# Patient Record
Sex: FEMALE | Race: BLACK OR AFRICAN AMERICAN | Employment: FULL TIME | URBAN - METROPOLITAN AREA
[De-identification: names, ages, dates, MRNs, and addresses within clinical notes are randomized per-mention and may not be internally consistent; named-entity substitution may affect disease eponyms.]

---

## 2017-02-07 ENCOUNTER — HOSPITAL ENCOUNTER (OUTPATIENT)
Age: 32
Discharge: HOME | End: 2017-02-07
Attending: OBSTETRICS & GYNECOLOGY | Admitting: OBSTETRICS & GYNECOLOGY
Payer: MEDICAID

## 2017-02-07 VITALS — TEMPERATURE: 98.4 F | RESPIRATION RATE: 18 BRPM | BODY MASS INDEX: 29.16 KG/M2 | HEIGHT: 65 IN | WEIGHT: 175 LBS

## 2017-02-07 LAB
APPEARANCE UR: CLEAR
BILIRUB UR QL: NEGATIVE
COLOR UR: YELLOW
GLUCOSE UR STRIP.AUTO-MCNC: NEGATIVE MG/DL
HGB UR QL STRIP: NEGATIVE
KETONES UR QL STRIP.AUTO: NEGATIVE MG/DL
LEUKOCYTE ESTERASE UR QL STRIP.AUTO: NEGATIVE
NITRITE UR QL STRIP.AUTO: NEGATIVE
PH UR STRIP: 8.5 [PH] (ref 5–8)
PROT UR STRIP-MCNC: NEGATIVE MG/DL
SP GR UR REFRACTOMETRY: 1.01 (ref 1–1.03)
UROBILINOGEN UR QL STRIP.AUTO: 1 EU/DL (ref 0.2–1)

## 2017-02-07 PROCEDURE — 81003 URINALYSIS AUTO W/O SCOPE: CPT | Performed by: OBSTETRICS & GYNECOLOGY

## 2017-02-07 PROCEDURE — 59025 FETAL NON-STRESS TEST: CPT

## 2017-02-07 PROCEDURE — 99283 EMERGENCY DEPT VISIT LOW MDM: CPT

## 2017-02-07 NOTE — PROGRESS NOTES
1150; pt to triage with c,/o left sided pain radiating into groin, that has been occurring since yesterday- on and off; ps=7,; denies need for analgesia; denies vag bleeding/loss; membranes intact ; feels fetal movement; states has [place] prenatal care out of state and that pregnancy healthy and uneventful; form signed  And faxed for request for medical info; efm on for assessment  1330; pt states pain is occas now- feels better as she is lying down  [phone]; pt OB office in [place] called several times with affirmative that they will send pts prenatal info; lab called for urinalysis results  5205; Dr Abhinav Rojas called with report- VE to be done; pt to make appointment to see Dr Abhinav Rojas today if possible, or tomorrow  [phone]; discharge instructions reviewed with pt; urinalysis results and prenatal records still not here/available- will fax to Dr Keiko Juárez when receive them; pt left with  ambulatory for home

## 2017-02-07 NOTE — DISCHARGE INSTRUCTIONS
[place] Contractions: Care Instructions  Your Care Instructions  [place] contractions prepare your uterus for labor. Think of them as a \"warm-up\" exercise that your body does. You may begin to feel them between the 28th and 30th weeks of your pregnancy. But they start as early as the 20th week. [place] contractions usually occur more often during the ninth month. They may go away when you are active and return when you rest. These contractions are like mild contractions of true labor, but they occur less often. (You feel fewer than 8 in an hour.) They don't cause your cervix to open. It may be hard for you to tell the difference between [place] contractions and true labor, especially in your first pregnancy. Follow-up care is a key part of your treatment and safety. Be sure to make and go to all appointments, and call your doctor if you are having problems. It's also a good idea to know your test results and keep a list of the medicines you take. How can you care for yourself at home? · Try a warm bath to help relieve muscle tension and reduce pain. · Change positions every 30 minutes. Take breaks if you must sit for a long time. Get up and walk around. · Drink plenty of water, enough so that your urine is light yellow or clear like water. · Taking short walks may help you feel better. Your doctor needs to check any contractions that are getting stronger or closer together. Where can you learn more? Go to http://hannah-madhavi.info/. Enter 938 675 178 in the search box to learn more about \"[place] Fraire Contractions: Care Instructions. \"  Current as of: May 30, 2016  Content Version: 11.1  © 5497-4064 [email or web address]. Care instructions adapted under license by [employer] (which disclaims liability or warranty for this information).  If you have questions about a medical condition or this instruction, always ask your healthcare professional. [employer], [facility] disclaims any warranty or liability for your use of this information. Week 39 of Your Pregnancy: Care Instructions  Your Care Instructions    During these final weeks, you may feel anxious to see your new baby. [place] babies often look different from what you see in pictures or movies. Right after birth, their heads may have a strange shape. Their eyes may be puffy. And their genitals may be swollen. They may also have very dry skin, or red marks on the eyelids, nose, or neck. Still, most parents think their babies are beautiful. Follow-up care is a key part of your treatment and safety. Be sure to make and go to all appointments, and call your doctor if you are having problems. It's also a good idea to know your test results and keep a list of the medicines you take. How can you care for yourself at home? Prepare to breastfeed  · If you are breastfeeding, continue to eat healthy foods. · Avoid alcohol, cigarettes, and drugs. This includes prescription and over-the-counter medicines. · You can help prevent sore nipples if you feed your baby in the correct position. Nurses will help you learn to do this. · Your  will need to be fed about every 1½ to 3 hours. Choose the right birth control after your baby is born  · Women who are breastfeeding can still get pregnant. Use birth control if you don't want to get pregnant. · Intrauterine devices (IUDs) work for women who want to wait at least 2 years before getting pregnant again. They are safe to use while you are breastfeeding. · Depo-Provera can be used while you are breastfeeding. It is a shot you get every 3 months. · Birth control pills work well. But you need a different kind of pill while you are breastfeeding. And when you start taking these pills, you need to make sure to use another type of birth control until you start your second pack.   · Diaphragms, cervical caps, tubal implants, and condoms with spermicide work less well after birth. If you have a diaphragm or cervical cap, you will need to have it refitted. · Tubal ligation (tying your tubes) and vasectomy are both permanent. These are good options if you are sure you are done having children. Where can you learn more? Go to http://hannah-madhavi.info/. Enter Z514 in the search box to learn more about \"Week 39 of Your Pregnancy: Care Instructions. \"  Current as of: May 30, 2016  Content Version: 11.1  © 0038-6484 [employer]. Care instructions adapted under license by [employer] (which disclaims liability or warranty for this information). If you have questions about a medical condition or this instruction, always ask your healthcare professional. [address] any warranty or liability for your use of this information.

## 2017-02-07 NOTE — IP AVS SNAPSHOT
[address] [address] 
[phone] Patient: Fredi Pryor MRN: [id number] ACS:0/11/4744 You are allergic to the following No active allergies Recent Documentation Height Weight BMI OB Status Smoking Status 1.651 m 79.4 kg 29.12 kg/m2 Pregnant Never Smoker Emergency Contacts Name Discharge Info Relation Home Work Mobile Deepti Reed DISCHARGE CAREGIVER [3] Boyfriend [17] [phone] About your hospitalization You were admitted on:  February 7, 2017 You last received care in the:  [facility] L&D TRIAGE You were discharged on:  February 7, 2017 Unit phone number:  [phone] Why you were hospitalized Your primary diagnosis was:  Not on File Providers Seen During Your Hospitalizations Provider Role Specialty Primary office phone Lisa Castañeda MD Attending Provider Obstetrics & Gynecology [phone] Your Primary Care Physician (PCP) Primary Care Physician Office Phone Office Fax NONE ** None ** ** None ** Follow-up Information Follow up With Details Comments Contact Info None   None ([phone]) Patient stated that they have no PCP Lisa Castañeda MD Today please make appointment to see Dr Parker Cummings today or tommorrow [address] [address] 
[phone] Current Discharge Medication List  
  
ASK your doctor about these medications Dose & Instructions Dispensing Information Comments Morning Noon Evening Bedtime Iron 160 mg (50 mg iron) Tber tablet Generic drug:  ferrous sulfate ER Your next dose is: Today, Tomorrow Other:  _________ Dose:  1 Tab Take 1 Tab by mouth daily. Refills:  0 PRENATAL DHA+COMPLETE PRENATAL -300 mg-mcg-mg Cmpk Generic drug:  PNV no.24-iron-folic acid-dha Your next dose is: Today, Tomorrow Other:  _________ Take  by mouth. Refills:  0 Discharge Instructions [facility] Contractions: Care Instructions Your Care Instructions [place] contractions prepare your uterus for labor. Think of them as a \"warm-up\" exercise that your body does. You may begin to feel them between the 28th and 30th weeks of your pregnancy. But they start as early as the 20th week. [place] contractions usually occur more often during the ninth month. They may go away when you are active and return when you rest. These contractions are like mild contractions of true labor, but they occur less often. (You feel fewer than 8 in an hour.) They don't cause your cervix to open. It may be hard for you to tell the difference between [facility] contractions and true labor, especially in your first pregnancy. Follow-up care is a key part of your treatment and safety. Be sure to make and go to all appointments, and call your doctor if you are having problems. It's also a good idea to know your test results and keep a list of the medicines you take. How can you care for yourself at home? · Try a warm bath to help relieve muscle tension and reduce pain. · Change positions every 30 minutes. Take breaks if you must sit for a long time. Get up and walk around. · Drink plenty of water, enough so that your urine is light yellow or clear like water. · Taking short walks may help you feel better. Your doctor needs to check any contractions that are getting stronger or closer together. Where can you learn more? Go to http://hannah-madhavi.info/. Enter 945 [id number] 229 in the search box to learn more about \"[place] Fraire Contractions: Care Instructions. \" Current as of: May 30, 2016 Content Version: 11.1 © 7616-9461 [employer].  Care instructions adapted under license by [employer] (which disclaims liability or warranty for this information). If you have questions about a medical condition or this instruction, always ask your healthcare professional. [place] [address] any warranty or liability for your use of this information. Week 39 of Your Pregnancy: Care Instructions Your Care Instructions During these final weeks, you may feel anxious to see your new baby. [place] babies often look different from what you see in pictures or movies. Right after birth, their heads may have a strange shape. Their eyes may be puffy. And their genitals may be swollen. They may also have very dry skin, or red marks on the eyelids, nose, or neck. Still, most parents think their babies are beautiful. Follow-up care is a key part of your treatment and safety. Be sure to make and go to all appointments, and call your doctor if you are having problems. It's also a good idea to know your test results and keep a list of the medicines you take. How can you care for yourself at home? Prepare to breastfeed · If you are breastfeeding, continue to eat healthy foods. · Avoid alcohol, cigarettes, and drugs. This includes prescription and over-the-counter medicines. · You can help prevent sore nipples if you feed your baby in the correct position. Nurses will help you learn to do this. · Your  will need to be fed about every 1½ to 3 hours. Choose the right birth control after your baby is born · Women who are breastfeeding can still get pregnant. Use birth control if you don't want to get pregnant. · Intrauterine devices (IUDs) work for women who want to wait at least 2 years before getting pregnant again. They are safe to use while you are breastfeeding. · Depo-Provera can be used while you are breastfeeding. It is a shot you get every 3 months. · Birth control pills work well. But you need a different kind of pill while you are breastfeeding.  And when you start taking these pills, you need to make sure to use another type of birth control until you start your second pack. · Diaphragms, cervical caps, tubal implants, and condoms with spermicide work less well after birth. If you have a diaphragm or cervical cap, you will need to have it refitted. · Tubal ligation (tying your tubes) and vasectomy are both permanent. These are good options if you are sure you are done having children. Where can you learn more? Go to http://hannah-madhavi.info/. Enter T779 in the search box to learn more about \"Week 39 of Your Pregnancy: Care Instructions. \" Current as of: May 30, 2016 Content Version: 11.1 © 2273-9277 [email or web address]. Care instructions adapted under license by [employer] (which disclaims liability or warranty for this information). If you have questions about a medical condition or this instruction, always ask your healthcare professional. [address] any warranty or liability for your use of this information. Discharge Orders None Introducing [facility]! [place] introduces [employer] patient portal. Now you can access parts of your medical record, email your doctor's office, and request medication refills online. 1. In your internet browser, go to [email or web address] 2. Click on the First Time User? Click Here link in the Sign In box. You will see the New Member Sign Up page. 3. Enter your [employer] Access Code exactly as it appears below. You will not need to use this code after youve completed the sign-up process. If you do not sign up before the expiration date, you must request a new code. · [employer] Access Code: [email or web address] Expires: 5/8/2017 11:35 AM 
 
4. Enter the last four digits of your Social Security Number (xxxx) and Date of Birth (mm/dd/yyyy) as indicated and click Submit. You will be taken to the next sign-up page. 5. Create a [employer] ID. This will be your [employer] login ID and cannot be changed, so think of one that is secure and easy to remember. 6. Create a [employer] password. You can change your password at any time. 7. Enter your Password Reset Question and Answer. This can be used at a later time if you forget your password. 8. Enter your e-mail address. You will receive e-mail notification when new information is available in [address] 9. Click Sign Up. You can now view and download portions of your medical record. 10. Click the Download Summary menu link to download a portable copy of your medical information. If you have questions, please visit the Frequently Asked Questions section of the [employer] website. Remember, [employer] is NOT to be used for urgent needs. For medical emergencies, dial 911. Now available from your iPhone and Android! General Information Please provide this summary of care documentation to your next provider. Patient Signature:  ____________________________________________________________ Date:  ____________________________________________________________  
  
Liudmila Guzman Provider Signature:  ____________________________________________________________ Date:  ____________________________________________________________

## 2017-02-13 ENCOUNTER — ANESTHESIA (OUTPATIENT)
Dept: LABOR AND DELIVERY | Age: 32
End: 2017-02-13
Payer: MEDICAID

## 2017-02-13 ENCOUNTER — HOSPITAL ENCOUNTER (INPATIENT)
Age: 32
LOS: 2 days | Discharge: HOME | End: 2017-02-15
Attending: OBSTETRICS & GYNECOLOGY | Admitting: OBSTETRICS & GYNECOLOGY
Payer: MEDICAID

## 2017-02-13 ENCOUNTER — ANESTHESIA EVENT (OUTPATIENT)
Dept: LABOR AND DELIVERY | Age: 32
End: 2017-02-13
Payer: MEDICAID

## 2017-02-13 PROBLEM — Z33.1 IUP (INTRAUTERINE PREGNANCY), INCIDENTAL: Status: ACTIVE | Noted: 2017-02-13

## 2017-02-13 LAB
ABO + RH BLD: NORMAL
BASOPHILS # BLD AUTO: 0 K/UL (ref 0–0.06)
BASOPHILS # BLD AUTO: 0 K/UL (ref 0–0.1)
BASOPHILS # BLD: 0 % (ref 0–2)
BASOPHILS # BLD: 0 % (ref 0–3)
BLOOD GROUP ANTIBODIES SERPL: NORMAL
DIFFERENTIAL METHOD BLD: ABNORMAL
DIFFERENTIAL METHOD BLD: ABNORMAL
EOSINOPHIL # BLD: 0 K/UL (ref 0–0.4)
EOSINOPHIL # BLD: 0.2 K/UL (ref 0–0.4)
EOSINOPHIL NFR BLD: 0 % (ref 0–5)
EOSINOPHIL NFR BLD: 2 % (ref 0–5)
ERYTHROCYTE [DISTWIDTH] IN BLOOD BY AUTOMATED COUNT: 14.6 % (ref 11.6–14.5)
ERYTHROCYTE [DISTWIDTH] IN BLOOD BY AUTOMATED COUNT: 14.8 % (ref 11.6–14.5)
HBV SURFACE AG SER QL: <0.1 INDEX
HBV SURFACE AG SER QL: NEGATIVE
HCT VFR BLD AUTO: 29.6 % (ref 35–45)
HCT VFR BLD AUTO: 30.9 % (ref 35–45)
HGB BLD-MCNC: 10 G/DL (ref 12–16)
HGB BLD-MCNC: 9.6 G/DL (ref 12–16)
HIV1+2 AB SPEC QL IA.RAPID: NEGATIVE
LYMPHOCYTES # BLD AUTO: 30 % (ref 21–52)
LYMPHOCYTES # BLD AUTO: 4 % (ref 20–51)
LYMPHOCYTES # BLD: 0.5 K/UL (ref 0.8–3.5)
LYMPHOCYTES # BLD: 2.3 K/UL (ref 0.9–3.6)
MCH RBC QN AUTO: 27.9 PG (ref 24–34)
MCH RBC QN AUTO: 27.9 PG (ref 24–34)
MCHC RBC AUTO-ENTMCNC: 32.4 G/DL (ref 31–37)
MCHC RBC AUTO-ENTMCNC: 32.4 G/DL (ref 31–37)
MCV RBC AUTO: 86 FL (ref 74–97)
MCV RBC AUTO: 86.1 FL (ref 74–97)
METAMYELOCYTES NFR BLD MANUAL: 1 %
MONOCYTES # BLD: 0.8 K/UL (ref 0.05–1.2)
MONOCYTES # BLD: 1.5 K/UL (ref 0–1)
MONOCYTES NFR BLD AUTO: 10 % (ref 3–10)
MONOCYTES NFR BLD AUTO: 12 % (ref 2–9)
NEUTS BAND NFR BLD MANUAL: 13 % (ref 0–5)
NEUTS SEG # BLD: 4.5 K/UL (ref 1.8–8)
NEUTS SEG # BLD: 8.6 K/UL (ref 1.8–8)
NEUTS SEG NFR BLD AUTO: 58 % (ref 40–73)
NEUTS SEG NFR BLD AUTO: 70 % (ref 42–75)
PLATELET # BLD AUTO: 128 K/UL (ref 135–420)
PLATELET # BLD AUTO: 162 K/UL (ref 135–420)
PMV BLD AUTO: 10.4 FL (ref 9.2–11.8)
PMV BLD AUTO: 11 FL (ref 9.2–11.8)
RBC # BLD AUTO: 3.44 M/UL (ref 4.2–5.3)
RBC # BLD AUTO: 3.59 M/UL (ref 4.2–5.3)
RBC MORPH BLD: ABNORMAL
RPR SER QL: NONREACTIVE
RUBV IGG SER-IMP: NORMAL
SPECIMEN EXP DATE BLD: NORMAL
WBC # BLD AUTO: 12.3 K/UL (ref 4.6–13.2)
WBC # BLD AUTO: 7.7 K/UL (ref 4.6–13.2)

## 2017-02-13 PROCEDURE — 00HU33Z INSERTION OF INFUSION DEVICE INTO SPINAL CANAL, PERCUTANEOUS APPROACH: ICD-10-PCS | Performed by: ANESTHESIOLOGY

## 2017-02-13 PROCEDURE — 0HQ9XZZ REPAIR PERINEUM SKIN, EXTERNAL APPROACH: ICD-10-PCS | Performed by: OBSTETRICS & GYNECOLOGY

## 2017-02-13 PROCEDURE — 74011250636 HC RX REV CODE- 250/636: Performed by: OBSTETRICS & GYNECOLOGY

## 2017-02-13 PROCEDURE — 77030034849

## 2017-02-13 PROCEDURE — 76060000078 HC EPIDURAL ANESTHESIA

## 2017-02-13 PROCEDURE — 75410000002 HC LABOR FEE PER 1 HR

## 2017-02-13 PROCEDURE — 86592 SYPHILIS TEST NON-TREP QUAL: CPT | Performed by: OBSTETRICS & GYNECOLOGY

## 2017-02-13 PROCEDURE — 87340 HEPATITIS B SURFACE AG IA: CPT | Performed by: OBSTETRICS & GYNECOLOGY

## 2017-02-13 PROCEDURE — 74011250636 HC RX REV CODE- 250/636: Performed by: NURSE PRACTITIONER

## 2017-02-13 PROCEDURE — 75410000003 HC RECOV DEL/VAG/CSECN EA 0.5 HR

## 2017-02-13 PROCEDURE — 99282 EMERGENCY DEPT VISIT SF MDM: CPT

## 2017-02-13 PROCEDURE — 75410000000 HC DELIVERY VAGINAL/SINGLE

## 2017-02-13 PROCEDURE — 74011250636 HC RX REV CODE- 250/636: Performed by: ADVANCED PRACTICE MIDWIFE

## 2017-02-13 PROCEDURE — 74011000258 HC RX REV CODE- 258: Performed by: OBSTETRICS & GYNECOLOGY

## 2017-02-13 PROCEDURE — 76815 OB US LIMITED FETUS(S): CPT

## 2017-02-13 PROCEDURE — 74011250636 HC RX REV CODE- 250/636

## 2017-02-13 PROCEDURE — 74011250636 HC RX REV CODE- 250/636: Performed by: ANESTHESIOLOGY

## 2017-02-13 PROCEDURE — 86703 HIV-1/HIV-2 1 RESULT ANTBDY: CPT | Performed by: OBSTETRICS & GYNECOLOGY

## 2017-02-13 PROCEDURE — 77030007879 HC KT SPN EPDRL TELE -B: Performed by: ANESTHESIOLOGY

## 2017-02-13 PROCEDURE — 74011000250 HC RX REV CODE- 250

## 2017-02-13 PROCEDURE — 36415 COLL VENOUS BLD VENIPUNCTURE: CPT | Performed by: NURSE PRACTITIONER

## 2017-02-13 PROCEDURE — 86762 RUBELLA ANTIBODY: CPT | Performed by: OBSTETRICS & GYNECOLOGY

## 2017-02-13 PROCEDURE — 65270000029 HC RM PRIVATE

## 2017-02-13 PROCEDURE — 86900 BLOOD TYPING SEROLOGIC ABO: CPT | Performed by: OBSTETRICS & GYNECOLOGY

## 2017-02-13 PROCEDURE — 85025 COMPLETE CBC W/AUTO DIFF WBC: CPT | Performed by: OBSTETRICS & GYNECOLOGY

## 2017-02-13 RX ORDER — HYDROMORPHONE HYDROCHLORIDE 1 MG/ML
1 INJECTION, SOLUTION INTRAMUSCULAR; INTRAVENOUS; SUBCUTANEOUS
Status: DISCONTINUED | OUTPATIENT
Start: 2017-02-13 | End: 2017-02-13 | Stop reason: HOSPADM

## 2017-02-13 RX ORDER — OXYCODONE AND ACETAMINOPHEN 5; 325 MG/1; MG/1
2 TABLET ORAL
Status: DISCONTINUED | OUTPATIENT
Start: 2017-02-13 | End: 2017-02-16 | Stop reason: HOSPADM

## 2017-02-13 RX ORDER — SODIUM CHLORIDE 0.9 % (FLUSH) 0.9 %
5-10 SYRINGE (ML) INJECTION EVERY 8 HOURS
Status: DISCONTINUED | OUTPATIENT
Start: 2017-02-13 | End: 2017-02-13 | Stop reason: HOSPADM

## 2017-02-13 RX ORDER — OXYTOCIN/RINGER'S LACTATE 20/1000 ML
125 PLASTIC BAG, INJECTION (ML) INTRAVENOUS CONTINUOUS
Status: DISCONTINUED | OUTPATIENT
Start: 2017-02-13 | End: 2017-02-13 | Stop reason: HOSPADM

## 2017-02-13 RX ORDER — NALOXONE HYDROCHLORIDE 0.4 MG/ML
0.2 INJECTION, SOLUTION INTRAMUSCULAR; INTRAVENOUS; SUBCUTANEOUS AS NEEDED
Status: DISCONTINUED | OUTPATIENT
Start: 2017-02-13 | End: 2017-02-13 | Stop reason: HOSPADM

## 2017-02-13 RX ORDER — PROMETHAZINE HYDROCHLORIDE 25 MG/ML
25 INJECTION, SOLUTION INTRAMUSCULAR; INTRAVENOUS
Status: DISCONTINUED | OUTPATIENT
Start: 2017-02-13 | End: 2017-02-16 | Stop reason: HOSPADM

## 2017-02-13 RX ORDER — TERBUTALINE SULFATE 1 MG/ML
0.25 INJECTION SUBCUTANEOUS
Status: DISCONTINUED | OUTPATIENT
Start: 2017-02-13 | End: 2017-02-13 | Stop reason: HOSPADM

## 2017-02-13 RX ORDER — FENTANYL CITRATE 50 UG/ML
INJECTION, SOLUTION INTRAMUSCULAR; INTRAVENOUS
Status: DISPENSED
Start: 2017-02-13 | End: 2017-02-13

## 2017-02-13 RX ORDER — OXYTOCIN IN 5 % DEXTROSE 30/500 ML
.5-2 PLASTIC BAG, INJECTION (ML) INTRAVENOUS
Status: DISCONTINUED | OUTPATIENT
Start: 2017-02-13 | End: 2017-02-15

## 2017-02-13 RX ORDER — FENTANYL/ROPIVACAINE/NS/PF 2MCG/ML-.1
1-15 PLASTIC BAG, INJECTION (ML) EPIDURAL
Status: DISCONTINUED | OUTPATIENT
Start: 2017-02-13 | End: 2017-02-13 | Stop reason: HOSPADM

## 2017-02-13 RX ORDER — FENTANYL/ROPIVACAINE/NS/PF 2MCG/ML-.1
PLASTIC BAG, INJECTION (ML) EPIDURAL
Status: COMPLETED
Start: 2017-02-13 | End: 2017-02-13

## 2017-02-13 RX ORDER — AMOXICILLIN 250 MG
1 CAPSULE ORAL
Status: DISCONTINUED | OUTPATIENT
Start: 2017-02-13 | End: 2017-02-16 | Stop reason: HOSPADM

## 2017-02-13 RX ORDER — FENTANYL CITRATE 50 UG/ML
INJECTION, SOLUTION INTRAMUSCULAR; INTRAVENOUS AS NEEDED
Status: DISCONTINUED | OUTPATIENT
Start: 2017-02-13 | End: 2017-02-13 | Stop reason: HOSPADM

## 2017-02-13 RX ORDER — BUTORPHANOL TARTRATE 2 MG/ML
2 INJECTION INTRAMUSCULAR; INTRAVENOUS
Status: DISCONTINUED | OUTPATIENT
Start: 2017-02-13 | End: 2017-02-13

## 2017-02-13 RX ORDER — NALBUPHINE HYDROCHLORIDE 10 MG/ML
10 INJECTION, SOLUTION INTRAMUSCULAR; INTRAVENOUS; SUBCUTANEOUS
Status: DISCONTINUED | OUTPATIENT
Start: 2017-02-13 | End: 2017-02-13 | Stop reason: HOSPADM

## 2017-02-13 RX ORDER — OXYTOCIN/RINGER'S LACTATE 20/1000 ML
500 PLASTIC BAG, INJECTION (ML) INTRAVENOUS ONCE
Status: DISPENSED | OUTPATIENT
Start: 2017-02-13 | End: 2017-02-13

## 2017-02-13 RX ORDER — GENTAMICIN SULFATE 80 MG/100ML
80 INJECTION, SOLUTION INTRAVENOUS EVERY 8 HOURS
Status: DISCONTINUED | OUTPATIENT
Start: 2017-02-13 | End: 2017-02-15

## 2017-02-13 RX ORDER — METHYLERGONOVINE MALEATE 0.2 MG/ML
0.2 INJECTION INTRAVENOUS AS NEEDED
Status: DISCONTINUED | OUTPATIENT
Start: 2017-02-13 | End: 2017-02-13 | Stop reason: HOSPADM

## 2017-02-13 RX ORDER — SODIUM CHLORIDE, SODIUM LACTATE, POTASSIUM CHLORIDE, CALCIUM CHLORIDE 600; 310; 30; 20 MG/100ML; MG/100ML; MG/100ML; MG/100ML
125 INJECTION, SOLUTION INTRAVENOUS CONTINUOUS
Status: DISCONTINUED | OUTPATIENT
Start: 2017-02-13 | End: 2017-02-13 | Stop reason: HOSPADM

## 2017-02-13 RX ORDER — IBUPROFEN 400 MG/1
800 TABLET ORAL
Status: DISCONTINUED | OUTPATIENT
Start: 2017-02-13 | End: 2017-02-16 | Stop reason: HOSPADM

## 2017-02-13 RX ORDER — SODIUM CHLORIDE 0.9 % (FLUSH) 0.9 %
5-10 SYRINGE (ML) INJECTION AS NEEDED
Status: DISCONTINUED | OUTPATIENT
Start: 2017-02-13 | End: 2017-02-13 | Stop reason: HOSPADM

## 2017-02-13 RX ORDER — LIDOCAINE HYDROCHLORIDE AND EPINEPHRINE 20; 5 MG/ML; UG/ML
INJECTION, SOLUTION EPIDURAL; INFILTRATION; INTRACAUDAL; PERINEURAL AS NEEDED
Status: DISCONTINUED | OUTPATIENT
Start: 2017-02-13 | End: 2017-02-13 | Stop reason: HOSPADM

## 2017-02-13 RX ORDER — PHENYLEPHRINE HCL IN 0.9% NACL 0.4MG/10ML
80 SYRINGE (ML) INTRAVENOUS AS NEEDED
Status: DISCONTINUED | OUTPATIENT
Start: 2017-02-13 | End: 2017-02-13 | Stop reason: HOSPADM

## 2017-02-13 RX ORDER — MINERAL OIL
30 OIL (ML) ORAL AS NEEDED
Status: DISCONTINUED | OUTPATIENT
Start: 2017-02-13 | End: 2017-02-13 | Stop reason: HOSPADM

## 2017-02-13 RX ORDER — LIDOCAINE HYDROCHLORIDE 10 MG/ML
INJECTION INFILTRATION; PERINEURAL
Status: DISPENSED
Start: 2017-02-13 | End: 2017-02-13

## 2017-02-13 RX ORDER — ZOLPIDEM TARTRATE 5 MG/1
5 TABLET ORAL
Status: DISCONTINUED | OUTPATIENT
Start: 2017-02-13 | End: 2017-02-16 | Stop reason: HOSPADM

## 2017-02-13 RX ORDER — LIDOCAINE HYDROCHLORIDE 10 MG/ML
20 INJECTION, SOLUTION EPIDURAL; INFILTRATION; INTRACAUDAL; PERINEURAL AS NEEDED
Status: DISCONTINUED | OUTPATIENT
Start: 2017-02-13 | End: 2017-02-13 | Stop reason: HOSPADM

## 2017-02-13 RX ORDER — FENTANYL CITRATE 50 UG/ML
100 INJECTION, SOLUTION INTRAMUSCULAR; INTRAVENOUS ONCE
Status: ACTIVE | OUTPATIENT
Start: 2017-02-13 | End: 2017-02-13

## 2017-02-13 RX ORDER — ACETAMINOPHEN 10 MG/ML
1000 INJECTION, SOLUTION INTRAVENOUS ONCE
Status: COMPLETED | OUTPATIENT
Start: 2017-02-13 | End: 2017-02-13

## 2017-02-13 RX ORDER — ACETAMINOPHEN 325 MG/1
650 TABLET ORAL
Status: DISCONTINUED | OUTPATIENT
Start: 2017-02-13 | End: 2017-02-16 | Stop reason: HOSPADM

## 2017-02-13 RX ADMIN — SODIUM CHLORIDE, SODIUM LACTATE, POTASSIUM CHLORIDE, AND CALCIUM CHLORIDE 500 ML: 600; 310; 30; 20 INJECTION, SOLUTION INTRAVENOUS at 02:45

## 2017-02-13 RX ADMIN — Medication 15 ML/HR: at 16:27

## 2017-02-13 RX ADMIN — Medication 15 ML/HR: at 05:18

## 2017-02-13 RX ADMIN — ACETAMINOPHEN 1000 MG: 10 INJECTION, SOLUTION INTRAVENOUS at 19:22

## 2017-02-13 RX ADMIN — SODIUM CHLORIDE, SODIUM LACTATE, POTASSIUM CHLORIDE, AND CALCIUM CHLORIDE 125 ML/HR: 600; 310; 30; 20 INJECTION, SOLUTION INTRAVENOUS at 09:41

## 2017-02-13 RX ADMIN — GENTAMICIN SULFATE 80 MG: 80 INJECTION, SOLUTION INTRAVENOUS at 19:46

## 2017-02-13 RX ADMIN — FENTANYL CITRATE 100 MCG: 50 INJECTION, SOLUTION INTRAMUSCULAR; INTRAVENOUS at 05:09

## 2017-02-13 RX ADMIN — Medication 15 ML/HR: at 10:30

## 2017-02-13 RX ADMIN — LIDOCAINE HYDROCHLORIDE AND EPINEPHRINE 2 ML: 20; 5 INJECTION, SOLUTION EPIDURAL; INFILTRATION; INTRACAUDAL; PERINEURAL at 05:08

## 2017-02-13 RX ADMIN — PENICILLIN G POTASSIUM 2.5 MILLION UNITS: 20000000 POWDER, FOR SOLUTION INTRAVENOUS at 06:52

## 2017-02-13 RX ADMIN — PENICILLIN G POTASSIUM 2.5 MILLION UNITS: 20000000 POWDER, FOR SOLUTION INTRAVENOUS at 10:28

## 2017-02-13 RX ADMIN — PENICILLIN G POTASSIUM 2.5 MILLION UNITS: 20000000 POWDER, FOR SOLUTION INTRAVENOUS at 15:15

## 2017-02-13 RX ADMIN — Medication 2 MILLI-UNITS/MIN: at 08:34

## 2017-02-13 RX ADMIN — PENICILLIN G POTASSIUM 2.5 MILLION UNITS: 20000000 POWDER, FOR SOLUTION INTRAVENOUS at 19:22

## 2017-02-13 RX ADMIN — SODIUM CHLORIDE 5 MILLION UNITS: 900 INJECTION INTRAVENOUS at 02:44

## 2017-02-13 NOTE — ANESTHESIA PREPROCEDURE EVALUATION
Anesthetic History   No history of anesthetic complications            Review of Systems / Medical History  Patient summary reviewed, nursing notes reviewed and pertinent labs reviewed    Pulmonary  Within defined limits                 Neuro/Psych   Within defined limits           Cardiovascular  Within defined limits                     GI/Hepatic/Renal  Within defined limits              Endo/Other  Within defined limits           Other Findings              Physical Exam    Airway  Mallampati: II  TM Distance: 4 - 6 cm  Neck ROM: normal range of motion   Mouth opening: Normal     Cardiovascular  Regular rate and rhythm,  S1 and S2 normal,  no murmur, click, rub, or gallop             Dental  No notable dental hx       Pulmonary  Breath sounds clear to auscultation               Abdominal  Abdominal exam normal       Other Findings            Anesthetic Plan    ASA: 2  Anesthesia type: epidural          Induction: Intravenous  Anesthetic plan and risks discussed with: Family and Patient

## 2017-02-13 NOTE — H&P
History & Physical    Name: Reina Parish MRN: [id number]  SSN: [id number]    YOB: 1985  Age: 36 y.o. Sex: female        Subjective:     Estimated Date of Delivery: 17  OB History      Para Term  AB TAB SAB Ectopic Multiple Living    2 1                    Ms. Carmen Webb is admitted with pregnancy at 39w6d for active labor. Prenatal course was uncomplicated per pt. Pt was last seen in [place] in December. No prenatal records available at this time. Please see prenatal records for details. No Known Allergies     NO significant medical or surgical hx. 1 previous  without complication. Objective:     Vitals:  Vitals:    17 0034 17 0036 17 0101 17 0248   BP: 121/70  105/76    Pulse: 87  (!) 117    Resp:    18   Temp:    97.7 °F (36.5 °C)   Weight:  78.9 kg (174 lb)     Height:  5' 5\" (1.651 m)          Physical Exam:  Patient without distress. Breast: normal breast exam  Heart: Regular rate and rhythm, S1S2 present or without murmur or extra heart sounds  Lung: clear to auscultation throughout lung fields, no wheezes, no rales, no rhonchi and normal respiratory effort  Back: costovertebral angle tenderness absent  Abdomen: soft, nontender  Perineum: blood absent, amniotic fluid absent  5 cm dilated  Membranes:  Intact  Fetal Heart Rate & Contraction pattern: Reactive    Prenatal Labs:   No results found for: RUBELLAEXT, GRBSEXT, HBSAGEXT, HIVEXT, RPREXT, GONNOEXT, CHLAMEXT      Assessment/Plan:     Plan: Admit for Reassuring fetal status, Labor  Progressing normally, Continue plan for vaginal delivery. Group B Strep was unknown-will treat with PCN due to unknown history.     Signed By:  Loki Aguilar CNM     2017

## 2017-02-13 NOTE — PROGRESS NOTES
OB Labor Note    Assessment:   IUP in labor with AROM    Plan: Will start pitocin titration               Urine GCCT ordered               Epidural for pain management               Anticipate    Subjective:   Pt. Does not have any complaints. Pt. is not feeling contractions. Pt. is feeling fetal movement. Objective:     Visit Vitals    /76    Pulse (!) 117    Temp 97.7 °F (36.5 °C)    Resp 18    Ht 5' 5\" (1.651 m)    Wt 78.9 kg (174 lb)    BMI 28.96 kg/m2     Cervix: 4/70/-2, mid, vertex (confirmed with bedside ultrasound)   EFM: 130's, mod variability, accels, no decles   [place]: Ctx spaced every 6-9 minutes and mild to palpation   Membranes: AROM, clear fluid noted on padding   Afebrile and VSS          Patient Vitals for the past 4 hrs:    Mode Fetal Heart Rate Fetal Activity Variability Decelerations Accelerations   17 0700 External 130 Present 6-25 BPM Variable No   17 0630 External 130 Present 6-25 BPM Variable No   17 0530 External 145 Present 6-25 BPM None No   17 0500 External 135 Present 6-25 BPM None No   17 0430 External 140 Present 6-25 BPM None No   17 0400 External 145 Present 6-25 BPM None No        Enid Singh MD  2017 7:54 AM

## 2017-02-13 NOTE — ROUTINE PROCESS
0255-SBAR report received  from Tiffani Tenorio. Pt laying in bed. Pt assessment within normal limits. 0423-Pt requesting epidural. SVE 6/ 50/-3. Paged Apolonia Hassan. Rose Hernandez. 0459-Monsalve at bedside educating about epidural.     0502-Test dose. Fentanyl given to Apolonia Hassan. 0508-Test dose. 0545-[place] placed. 0615-CNM at bedside, SVE 5/90/-1, AROM clear. 0712-SBAR given to LEONARD Kim RN. Epidural verified.

## 2017-02-13 NOTE — IP AVS SNAPSHOT
Summary of Care Report The Summary of Care report has been created to help improve care coordination. Users with access to [facility] or [address] (Web-based application) may access additional patient information including the Discharge Summary. If you are not currently a [address] user and need more information, please call the number listed below in the [place] [address] section and ask to be connected with Medical Records. Facility Information Name Address Phone [address] [address] [phone] Patient Information Patient Name Sex  [facility] ([id number]) Female 1985 Discharge Information Admitting Provider Service Area Unit Howard King MD / [phone] [address] / [phone] Discharge Provider Discharge Date/Time Discharge Disposition Destination (none) 2/15/2017 Morning (Pending) AHR (none) Patient Language Language ENGLISH [13] Problem List as of 2/15/2017  Date Reviewed: 2017 Codes Priority Class Noted - Resolved  
 IUP (intrauterine pregnancy), incidental ICD-10-CM: Z33.1 ICD-9-CM: V22.2   2017 - Present You are allergic to the following No active allergies Current Discharge Medication List  
  
START taking these medications Dose & Instructions Dispensing Information Comments  
 ibuprofen 800 mg tablet Commonly known as:  MOTRIN Dose:  800 mg Take 1 Tab by mouth every eight (8) hours as needed. Quantity:  90 Tab Refills:  0 CONTINUE these medications which have NOT CHANGED Dose & Instructions Dispensing Information Comments Iron 160 mg (50 mg iron) Tber tablet Generic drug:  ferrous sulfate ER Dose:  1 Tab Take 1 Tab by mouth daily. Refills:  0 PRENATAL DHA+COMPLETE PRENATAL -300 mg-mcg-mg Cmpk Generic drug:  PNV no.24-iron-folic acid-dha Take  by mouth. Refills:  0 Surgery Information ID Date/Time Status Primary Surgeon All Procedures Location [id number] 2/13/2017 Complete   [facility]HESIA [facility] - DO NOT SCHEDULE Follow-up Information Follow up With Details Comments Contact Info None   None (395) Patient stated that they have no PCP Discharge Instructions POST DELIVERY DISCHARGE INSTRUCTIONS Name: Karley Min YOB: 1985 Primary Diagnosis: Active Problems: 
  IUP (intrauterine pregnancy), incidental (2/13/2017) General:  
 
Diet/Diet Restrictions: 
Eight 8-ounce glasses of fluid daily (water, juices); avoid excessive caffeine intake. Meals/snacks as desired which are high in fiber and carbohydrates and low in fat and cholesterol. Physical Activity / Restrictions / Safety:  
 
Avoid heavy lifting, no more that 8 lbs. For 2-3 weeks; limit use of stairs to 2 times daily for the first week home. No driving for one week. Avoid intercourse 4-6 weeks, no douching or tampon use. Check with obstetrician before starting or resuming an exercise program.    
 
 
Discharge Instructions/Special Treatment/Home Care Needs:  
 
Continue prenatal vitamins. Continue to use squirt bottle with warm water on your episiotomy after each bathroom use until bleeding stops. If steri-strips applied to your incision, remove in 7-10 days. Call your doctor for the following:  
 
Fever over 101 degrees by mouth. Vaginal bleeding heavier than a normal menstrual period or clot larger than a golf ball. Red streaks or increased swelling of legs, painful red streaks on your breast. 
Painful urination, constipation and increased pain or swelling or discharge with your incision. If you feel extremely anxious or overwhelmed. If you have thoughts of harming yourself and/or your baby. Pain Management:  
 
Pain Management:  
Take Acetaminophen (Tylenol) or Ibuprofen (Advil, Motrin), as directed for pain. Use a warm Sitz bath 3 times daily to relieve episiotomy or hemorrhoidal discomfort. Heating pad to  incision as needed. For hemorrhoidal discomfort, use Tucks and Anusol cream as needed and directed. Follow-Up Care: These are general instructions for a healthy lifestyle: No smoking/ No tobacco products/ Avoid exposure to second hand smoke Surgeon General's Warning:  Quitting smoking now greatly reduces serious risk to your health. Obesity, smoking, and sedentary lifestyle greatly increases your risk for illness A healthy diet, regular physical exercise & weight monitoring are important for maintaining a healthy lifestyle Recognize signs and symptoms of STROKE: 
 
F-face looks uneven A-arms unable to move or move unevenly S-speech slurred or non-existent T-time-call 911 as soon as signs and symptoms begin-DO NOT go Back to bed or wait to see if you get better-TIME IS BRAIN. Signed By: Stevan Cook RN                                                                                                   Date: 2/15/2017 Time: 4:05 PM 
 
 
  
After Your Delivery (the Postpartum Period): Care Instructions Your Care Instructions Congratulations on the birth of your baby. Like pregnancy, the  period can be a time of excitement, edgar, and exhaustion. You may look at your wondrous little baby and feel happy. You may also be overwhelmed by your new sleep hours and new responsibilities. At first, babies often sleep during the days and are awake at night. They do not have a pattern or routine. They may make sudden gasps, jerk themselves awake, or look like they have crossed eyes. These are all normal, and they may even make you smile. In these first weeks after delivery, try to take good care of yourself.  It may take 4 to 6 weeks to feel like yourself again, and possibly longer if you had a  birth. You will likely feel very tired for several weeks. Your days will be full of ups and downs, but lots of edgar as well. Follow-up care is a key part of your treatment and safety. Be sure to make and go to all appointments, and call your doctor if you are having problems. It's also a good idea to know your test results and keep a list of the medicines you take. How can you care for yourself at home? Take care of your body after delivery · Use pads instead of tampons for the bloody flow that may last as long as 2 weeks. · Ease cramps with ibuprofen (Advil, Motrin). · Ease soreness of hemorrhoids and the area between your vagina and rectum with ice compresses or witch hazel pads. · Ease constipation by drinking lots of fluid and eating high-fiber foods. Ask your doctor about over-the-counter stool softeners. · Cleanse yourself with a gentle squeeze of warm water from a bottle instead of wiping with toilet paper. · Take a sitz bath in warm water several times a day. · Wear a good nursing bra. Ease sore and swollen breasts with warm, wet washcloths. · If you are not breastfeeding, use ice rather than heat for breast soreness. · Your period may not start for several months if you are breastfeeding. You may bleed more, and longer at first, than you did before you got pregnant. · Wait until you are healed (about 4 to 6 weeks) before you have sexual intercourse. Your doctor will tell you when it is okay to have sex. · Try not to travel with your baby for 5 or 6 weeks. If you take a long car trip, make frequent stops to walk around and stretch. Avoid exhaustion · Rest every day. Try to nap when your baby naps. · Ask another adult to be with you for a few days after delivery. · Plan for  if you have other children. · Stay flexible so you can eat at odd hours and sleep when you need to. Both you and your baby are making new schedules. · Plan small trips to get out of the house. Change can make you feel less tired. · Ask for help with housework, cooking, and shopping. Remind yourself that your job is to care for your baby. Know about help for postpartum depression · \"Baby blues\" are common for the first 1 to 2 weeks after birth. You may cry or feel sad or irritable for no reason. · Rest whenever you can. Being tired makes it harder to handle your emotions. · Go for walks with your baby. · Talk to your partner, friends, and family about your feelings. · If your symptoms last for more than a few weeks, or if you feel very depressed, ask your doctor for help. · Postpartum depression can be treated. Support groups and counseling can help. Sometimes medicine can also help. Stay healthy · Eat healthy foods so you have more energy, make good breast milk, and lose extra baby pounds. · If you breastfeed, avoid alcohol and drugs. Stay smoke-free. If you quit during pregnancy, congratulations. · Start daily exercise after 4 to 6 weeks, but rest when you feel tired. · Learn exercises to tone your belly. Do Kegel exercises to regain strength in your pelvic muscles. You can do these exercises while you stand or sit. ¨ Squeeze the same muscles you would use to stop your urine. Your belly and thighs should not move. ¨ Hold the squeeze for 3 seconds, and then relax for 3 seconds. ¨ Start with 3 seconds. Then add 1 second each week until you are able to squeeze for 10 seconds. ¨ Repeat the exercise 10 to 15 times for each session. Do three or more sessions each day. · Find a class for new mothers and new babies that has an exercise time. · If you had a  birth, give yourself a bit more time before you exercise, and be careful. When should you call for help? Call 911 anytime you think you may need emergency care. For example, call if: 
· You passed out (lost consciousness). Call your doctor now or seek immediate medical care if: 
· You have severe vaginal bleeding. This means you are passing blood clots and soaking through a pad each hour for 2 or more hours. · You are dizzy or lightheaded, or you feel like you may faint. · You have a fever. · You have new belly pain, or your pain gets worse. Watch closely for changes in your health, and be sure to contact your doctor if: 
· Your vaginal bleeding seems to be getting heavier. · You have new or worse vaginal discharge. · You feel sad, anxious, or hopeless for more than a few days. · You do not get better as expected. Where can you learn more? Go to http://hannah-madhavi.info/. Enter A461 in the search box to learn more about \"After Your Delivery (the Postpartum Period): Care Instructions. \" Current as of: May 30, 2016 Content Version: 11.1 © 1652-9593 [employer]. Care instructions adapted under license by [employer] (which disclaims liability or warranty for this information). If you have questions about a medical condition or this instruction, always ask your healthcare professional. [place] [address] any warranty or liability for your use of this information. Patient armband removed and given to patient to take home. Patient was informed of the privacy risks if armband lost or stolen Chart Review Routing History No Routing History on File

## 2017-02-13 NOTE — ANESTHESIA PROCEDURE NOTES
Epidural Block    Start time: 2/13/2017 4:59 AM  End time: 2/13/2017 5:12 AM  Performed by: Julien Bianchi by: Christine Ivan     Pre-Procedure  Indication: at surgeon's request and labor epidural    Preanesthetic Checklist: patient identified, risks and benefits discussed, anesthesia consent, site marked, patient being monitored, timeout performed and anesthesia consent    Timeout Time: 05:02        Epidural:   Patient position:  Seated  Prep region:  Lumbar  Prep: Chlorhexidine    Location:  L3-4    Needle and Epidural Catheter:   Needle Type:  Tuohy  Needle Gauge:  17 G  Injection Technique:  Loss of resistance using saline  Attempts:  1  Catheter Size:  19 G  Catheter at Skin Depth (cm):  12  Depth in Epidural Space (cm):  5  Events: no blood with aspiration, no cerebrospinal fluid with aspiration, no paresthesia and negative aspiration test    Test Dose:  Negative    Assessment:   Catheter Secured:  Tegaderm and tape  Insertion:  Uncomplicated  Patient tolerance:  Patient tolerated the procedure well with no immediate complications

## 2017-02-13 NOTE — PROGRESS NOTES
0715: Bedside and Verbal shift change report given to PAOLO Kim RN (oncoming nurse) by Bud Alexander RN (offgoing nurse). Report included the following information SBAR, Kardex, Intake/Output, MAR and Recent Results. Care assumed    0730: Felicita Patel CNM at bedside. Vertex confirmed by ultrasound. SVE 3-4/70/-2    0945: lateral left with peanut ball    1105: lateral right with peanut ball    1205: Felicita Patel CNM at bedside to assess. SVE 5-6/80/-1. Strip reviewed by LUANNE. No further orders received at this time. High fowlers    1240: beginning to feel mild occ pressure. Instructed to call RN if pressure increases or becomes constant    1340: lateral left side    1352: peanut ball in place    1415: Bedside and Verbal shift change report given to Sanjana Jaime RN (oncoming nurse) by Suraj Maloney RN (offgoing nurse). Report included the following information SBAR, Kardex, Intake/Output, MAR and Recent Results.  Care surrendered

## 2017-02-13 NOTE — PROGRESS NOTES
1515 Bedside and Verbal shift change report given to CASEY Quispe RN (oncoming nurse) by Alan Galicia RN (offgoing nurse). Report included the following information SBAR, Kardex, Intake/Output, MAR and Recent Results. Assumed care of patient  [phone] Phone call from BRIGIDA Adams for report of pt status. SVE done by Mumtaz Lawson RN. Report given to BRIGIDA Olguin CNM of exam of 8cm. Berl Jeans, CNM coming in to hospital.  [id number] pt assisted to left side  1810 pt complains of increased pressure. SVE done cvs 10,100,0   1816 BRIGIDA Adams at bedside enc pt to breathe through contractions until pressure to push increases  1835 SVE done by BRIGIDA Adams with attempt x1 to push. Pt repositioned on right lateral with peanut ball to assist pt in laboring down.  [facility]IDA Benitez CNM of temp 100.2 and pulse rate of 117  1908 Bedside and Verbal shift change report given to IRINA Brooks (oncoming nurse) by Flavio Quispe RN (offgoing nurse). Report included the following information SBAR, Procedure Summary, MAR and Recent Results. No longer taking care of pt.

## 2017-02-13 NOTE — IP AVS SNAPSHOT
Current Discharge Medication List  
  
Take these medications at their scheduled times Dose & Instructions Dispensing Information Comments Morning Noon Evening Bedtime Iron 160 mg (50 mg iron) Tber tablet Generic drug:  ferrous sulfate ER Your next dose is: Today, Tomorrow Other:  ____________ Dose:  1 Tab Take 1 Tab by mouth daily. Refills:  0 Take these medications as needed Dose & Instructions Dispensing Information Comments Morning Noon Evening Bedtime  
 ibuprofen 800 mg tablet Commonly known as:  MOTRIN Your next dose is: Today, Tomorrow Other:  ____________ Dose:  800 mg Take 1 Tab by mouth every eight (8) hours as needed. Quantity:  90 Tab Refills:  0 Take these medications as directed Dose & Instructions Dispensing Information Comments Morning Noon Evening Bedtime PRENATAL DHA+COMPLETE PRENATAL -300 mg-mcg-mg Cmpk Generic drug:  PNV no.24-iron-folic acid-dha Your next dose is: Today, Tomorrow Other:  ____________ Take  by mouth. Refills:  0 Where to Get Your Medications Information about where to get these medications is not yet available ! Ask your nurse or doctor about these medications  
  ibuprofen 800 mg tablet

## 2017-02-13 NOTE — PROGRESS NOTES
OB Labor Note    Assessment:   IUP in labor    Plan:   Continue to monitor labor              Pitocin titration    Anticipate       Subjective:   Pt. does not have any complaints. Pt. is not feeling contractions. Pt. is feeling fetal movement. Objective:     Visit Vitals    /76    Pulse 88    Temp 97.8 °F (36.6 °C)    Resp 18    Ht 5' 5\" (1.651 m)    Wt 78.9 kg (174 lb)    SpO2 100%    BMI 28.96 kg/m2      Cervix: 5/80/-1               EFM: 125-130, moderate variability, accels, occasional variable decels corrected with position changes               TOCO: Contractions every 1-2.5 min on 12 mU pitocin, with brief period of coupling               Membranes: leaking clear fluid               VSS and afebrile      Patient Vitals for the past 4 hrs:    Mode Fetal Heart Rate Fetal Activity Variability Decelerations Accelerations RN Reviewed Strip?   17 1115 - - - - - - Yes   17 1100 External 125 Present 6-25 BPM Variable Yes Yes   17 1045 External 125 Present 6-25 BPM Variable Yes Yes   17 1030 External 125 Present (!) Less than or equal to 5 BPM None Yes Yes   17 1015 External 125 Present 6-25 BPM Variable Yes Yes   17 1000 External 125 Present 6-25 BPM None No Yes   17 0945 External 125 Present 6-25 BPM None Yes Yes   17 0930 External 125 Present 6-25 BPM None No Yes   17 0915 External 125 Present 6-25 BPM None No Yes   17 0900 External 125 Present (!) Less than or equal to 5 BPM None Yes Yes   17 0845 External 125 Present (!) Less than or equal to 5 BPM Variable No Yes   17 0830 External 130 Present 6-25 BPM None Yes Yes          2017 12:11 PM

## 2017-02-13 NOTE — PROGRESS NOTES
Labor Progress Note  Patient seen, fetal heart rate and contraction pattern evaluated, patient examined. No data found. Physical Exam:  Cervical Exam:  5 (joshua Garduno )/90 %/-1/   Membranes:  Artificial Rupture of Membranes;  Amniotic Fluid: medium amount of clear fluid  Uterine Activity: Frequency: Every 3-5 minutes  Fetal Heart Rate: Reactive    Assessment/Plan:  Reassuring fetal status, Labor  Progressing normally, Continue plan for vaginal delivery   Signed:Ras Garduno CNM

## 2017-02-13 NOTE — PROGRESS NOTES
Labor Progress Note  Patient seen, fetal heart rate and contraction pattern evaluated, patient examined. Patient Vitals for the past 1 hrs:   BP Pulse SpO2   02/13/17 1808 - - 99 %   02/13/17 1803 - - 100 %   02/13/17 1800 120/56 (!) 109 -   02/13/17 1730 114/55 (!) 110 -   02/13/17 1728 - - 100 %   02/13/17 1723 - - 100 %       Physical Exam:  Cervical Exam:  10 cm dilated    100% effaced    0 station    Membranes:  Artificial Rupture of Membranes;  Amniotic Fluid: small amount of clear fluid  Uterine Activity: Frequency: every 2 min  Fetal Heart Rate: Reactive    Assessment/Plan:  Reassuring fetal status, Labor  Progressing normally, Continue plan for vaginal delivery

## 2017-02-13 NOTE — PROGRESS NOTES
0015 Received to L &  D unit c/o contractions. States she received [facility] in  [place] and has net been seen since December 8th. Assisted to BR to change into a gown. 0028 EFM tested and applied. Abdomen palpates soft in between ctx.     0147 KRISTINA Garduno CNM paged.      [id number] CNM returned page and informed of service call pt's arrival with c/o ctx, SVE and EFM described,

## 2017-02-13 NOTE — IP AVS SNAPSHOT
[address] [address] 
[phone] Patient: Norma Coyne MRN: [id number] XGW:[id number] You are allergic to the following No active allergies Recent Documentation Height Weight Breastfeeding? BMI OB Status Smoking Status 1.651 m 78.9 kg Unknown 28.96 kg/m2 Recent pregnancy Never Smoker Emergency Contacts Name Discharge Info Relation Home Work Mobile AugustinaAlec ruanodayanara DISCHARGE CAREGIVER [3] Boyfriend [17] [phone] About your hospitalization You were admitted on:  February 13, 2017 You last received care in the:  [address] [place] [address] You were discharged on:  February 15, 2017 Unit phone number:  [phone] Why you were hospitalized Your primary diagnosis was:  Not on File Your diagnoses also included:  Iup (Intrauterine Pregnancy), Incidental  
  
  
 
  
  
Providers Seen During Your Hospitalizations Provider Role Specialty Primary office phone Howard King MD Attending Provider Obstetrics & Gynecology [phone] Your Primary Care Physician (PCP) Primary Care Physician Office Phone Office Fax NONE ** None ** ** None ** Follow-up Information Follow up With Details Comments Contact Info None   None ([phone]) Patient stated that they have no PCP Current Discharge Medication List  
  
START taking these medications Dose & Instructions Dispensing Information Comments Morning Noon Evening Bedtime  
 ibuprofen 800 mg tablet Commonly known as:  MOTRIN Your next dose is: Today, Tomorrow Other:  _________ Dose:  800 mg Take 1 Tab by mouth every eight (8) hours as needed. Quantity:  90 Tab Refills:  0 CONTINUE these medications which have NOT CHANGED Dose & Instructions Dispensing Information Comments Morning Noon Evening Bedtime Iron 160 mg (50 mg iron) Tber tablet Generic drug:  ferrous sulfate ER Your next dose is: Today, Tomorrow Other:  _________ Dose:  1 Tab Take 1 Tab by mouth daily. Refills:  0 PRENATAL DHA+COMPLETE PRENATAL -300 mg-mcg-mg Cmpk Generic drug:  PNV no.24-iron-folic acid-dha Your next dose is: Today, Tomorrow Other:  _________ Take  by mouth. Refills:  0 Where to Get Your Medications Information on where to get these meds will be given to you by the nurse or doctor. ! Ask your nurse or doctor about these medications  
  ibuprofen 800 mg tablet Discharge Instructions POST DELIVERY DISCHARGE INSTRUCTIONS Name: Carina Koch YOB: 1985 Primary Diagnosis: Active Problems: 
  IUP (intrauterine pregnancy), incidental (2/13/2017) General:  
 
Diet/Diet Restrictions: 
Eight 8-ounce glasses of fluid daily (water, juices); avoid excessive caffeine intake. Meals/snacks as desired which are high in fiber and carbohydrates and low in fat and cholesterol. Physical Activity / Restrictions / Safety:  
 
Avoid heavy lifting, no more that 8 lbs. For 2-3 weeks; limit use of stairs to 2 times daily for the first week home. No driving for one week. Avoid intercourse 4-6 weeks, no douching or tampon use. Check with obstetrician before starting or resuming an exercise program.    
 
 
Discharge Instructions/Special Treatment/Home Care Needs:  
 
Continue prenatal vitamins. Continue to use squirt bottle with warm water on your episiotomy after each bathroom use until bleeding stops. If steri-strips applied to your incision, remove in 7-10 days. Call your doctor for the following:  
 
Fever over 101 degrees by mouth. Vaginal bleeding heavier than a normal menstrual period or clot larger than a golf ball. Red streaks or increased swelling of legs, painful red streaks on your breast. 
Painful urination, constipation and increased pain or swelling or discharge with your incision. If you feel extremely anxious or overwhelmed. If you have thoughts of harming yourself and/or your baby. Pain Management:  
 
Pain Management:  
Take Acetaminophen (Tylenol) or Ibuprofen (Advil, Motrin), as directed for pain. Use a warm Sitz bath 3 times daily to relieve episiotomy or hemorrhoidal discomfort. Heating pad to  incision as needed. For hemorrhoidal discomfort, use Tucks and Anusol cream as needed and directed. Follow-Up Care: These are general instructions for a healthy lifestyle: No smoking/ No tobacco products/ Avoid exposure to second hand smoke Surgeon General's Warning:  Quitting smoking now greatly reduces serious risk to your health. Obesity, smoking, and sedentary lifestyle greatly increases your risk for illness A healthy diet, regular physical exercise & weight monitoring are important for maintaining a healthy lifestyle Recognize signs and symptoms of STROKE: 
 
F-face looks uneven A-arms unable to move or move unevenly S-speech slurred or non-existent T-time-call 911 as soon as signs and symptoms begin-DO NOT go Back to bed or wait to see if you get better-TIME IS BRAIN. Signed By: Mehran Danielson RN                                                                                                   Date: 2/15/2017 Time: 4:05 PM 
 
 
  
After Your Delivery (the Postpartum Period): Care Instructions Your Care Instructions Congratulations on the birth of your baby. Like pregnancy, the  period can be a time of excitement, edgar, and exhaustion. You may look at your wondrous little baby and feel happy. You may also be overwhelmed by your new sleep hours and new responsibilities. At first, babies often sleep during the days and are awake at night.  They do not have a pattern or routine. They may make sudden gasps, jerk themselves awake, or look like they have crossed eyes. These are all normal, and they may even make you smile. In these first weeks after delivery, try to take good care of yourself. It may take 4 to 6 weeks to feel like yourself again, and possibly longer if you had a  birth. You will likely feel very tired for several weeks. Your days will be full of ups and downs, but lots of edgar as well. Follow-up care is a key part of your treatment and safety. Be sure to make and go to all appointments, and call your doctor if you are having problems. It's also a good idea to know your test results and keep a list of the medicines you take. How can you care for yourself at home? Take care of your body after delivery · Use pads instead of tampons for the bloody flow that may last as long as 2 weeks. · Ease cramps with ibuprofen (Advil, Motrin). · Ease soreness of hemorrhoids and the area between your vagina and rectum with ice compresses or witch hazel pads. · Ease constipation by drinking lots of fluid and eating high-fiber foods. Ask your doctor about over-the-counter stool softeners. · Cleanse yourself with a gentle squeeze of warm water from a bottle instead of wiping with toilet paper. · Take a sitz bath in warm water several times a day. · Wear a good nursing bra. Ease sore and swollen breasts with warm, wet washcloths. · If you are not breastfeeding, use ice rather than heat for breast soreness. · Your period may not start for several months if you are breastfeeding. You may bleed more, and longer at first, than you did before you got pregnant. · Wait until you are healed (about 4 to 6 weeks) before you have sexual intercourse. Your doctor will tell you when it is okay to have sex. · Try not to travel with your baby for 5 or 6 weeks. If you take a long car trip, make frequent stops to walk around and stretch. Avoid exhaustion · Rest every day. Try to nap when your baby naps. · Ask another adult to be with you for a few days after delivery. · Plan for  if you have other children. · Stay flexible so you can eat at odd hours and sleep when you need to. Both you and your baby are making new schedules. · Plan small trips to get out of the house. Change can make you feel less tired. · Ask for help with housework, cooking, and shopping. Remind yourself that your job is to care for your baby. Know about help for postpartum depression · \"Baby blues\" are common for the first 1 to 2 weeks after birth. You may cry or feel sad or irritable for no reason. · Rest whenever you can. Being tired makes it harder to handle your emotions. · Go for walks with your baby. · Talk to your partner, friends, and family about your feelings. · If your symptoms last for more than a few weeks, or if you feel very depressed, ask your doctor for help. · Postpartum depression can be treated. Support groups and counseling can help. Sometimes medicine can also help. Stay healthy · Eat healthy foods so you have more energy, make good breast milk, and lose extra baby pounds. · If you breastfeed, avoid alcohol and drugs. Stay smoke-free. If you quit during pregnancy, congratulations. · Start daily exercise after 4 to 6 weeks, but rest when you feel tired. · Learn exercises to tone your belly. Do Kegel exercises to regain strength in your pelvic muscles. You can do these exercises while you stand or sit. ¨ Squeeze the same muscles you would use to stop your urine. Your belly and thighs should not move. ¨ Hold the squeeze for 3 seconds, and then relax for 3 seconds. ¨ Start with 3 seconds. Then add 1 second each week until you are able to squeeze for 10 seconds. ¨ Repeat the exercise 10 to 15 times for each session. Do three or more sessions each day. · Find a class for new mothers and new babies that has an exercise time. · If you had a  birth, give yourself a bit more time before you exercise, and be careful. When should you call for help? Call 911 anytime you think you may need emergency care. For example, call if: 
· You passed out (lost consciousness). Call your doctor now or seek immediate medical care if: 
· You have severe vaginal bleeding. This means you are passing blood clots and soaking through a pad each hour for 2 or more hours. · You are dizzy or lightheaded, or you feel like you may faint. · You have a fever. · You have new belly pain, or your pain gets worse. Watch closely for changes in your health, and be sure to contact your doctor if: 
· Your vaginal bleeding seems to be getting heavier. · You have new or worse vaginal discharge. · You feel sad, anxious, or hopeless for more than a few days. · You do not get better as expected. Where can you learn more? Go to http://hannah-madhavi.info/. Enter A461 in the search box to learn more about \"After Your Delivery (the Postpartum Period): Care Instructions. \" Current as of: May 30, 2016 Content Version: 11.1 © 1962-7888 [employer]. Care instructions adapted under license by [employer] (which disclaims liability or warranty for this information). If you have questions about a medical condition or this instruction, always ask your healthcare professional. [place] [address] any warranty or liability for your use of this information. Patient armband removed and given to patient to take home. Patient was informed of the privacy risks if armband lost or stolen Discharge Orders None [email or web address] Announcement We are excited to announce that we are making your provider's discharge notes available to you in [employer].   You will see these notes when they are completed and signed by the physician that discharged you from your recent hospital stay. If you have any questions or concerns about any information you see in [employer], please call the Health Information Department where you were seen or reach out to your Primary Care Provider for more information about your plan of care. Introducing [facility]! Heather Bhakta introduces [employer] patient portal. Now you can access parts of your medical record, email your doctor's office, and request medication refills online. 1. In your internet browser, go to [email or web address] 2. Click on the First Time User? Click Here link in the Sign In box. You will see the New Member Sign Up page. 3. Enter your [employer] Access Code exactly as it appears below. You will not need to use this code after youve completed the sign-up process. If you do not sign up before the expiration date, you must request a new code. · [employer] Access Code: [email or web address] Expires: 5/8/2017 11:35 AM 
 
4. Enter the last four digits of your Social Security Number (xxxx) and Date of Birth (mm/dd/yyyy) as indicated and click Submit. You will be taken to the next sign-up page. 5. Create a [employer] ID. This will be your [employer] login ID and cannot be changed, so think of one that is secure and easy to remember. 6. Create a [employer] password. You can change your password at any time. 7. Enter your Password Reset Question and Answer. This can be used at a later time if you forget your password. 8. Enter your e-mail address. You will receive e-mail notification when new information is available in [address] 9. Click Sign Up. You can now view and download portions of your medical record. 10. Click the Download Summary menu link to download a portable copy of your medical information. If you have questions, please visit the Frequently Asked Questions section of the [employer] website. Remember, [employer] is NOT to be used for urgent needs. For medical emergencies, dial 911. Now available from your iPhone and Android! General Information Please provide this summary of care documentation to your next provider. Patient Signature:  ____________________________________________________________ Date:  ____________________________________________________________  
  
Joelene Batman Provider Signature:  ____________________________________________________________ Date:  ____________________________________________________________

## 2017-02-13 NOTE — PROGRESS NOTES
1415 Bedside and Verbal shift change report given to CIARA Mendoza, RN (oncoming nurse) by José Cisneros RN (offgoing nurse). Report included the following information SBAR, Kardex, Procedure Summary, Intake/Output, MAR and Recent Results. 1502 BRIGIDA Al on phone asking for update. Orders received to check cervix. SVE 6-7/80/-1.   1515 Bedside and Verbal shift change report given to CASEY Quispe RN (oncoming nurse) by Gerard Bermudez RN (offgoing nurse). Report included the following information SBAR, Kardex, Intake/Output, MAR and Recent Results.

## 2017-02-14 LAB
HCT VFR BLD AUTO: 28.7 % (ref 35–45)
HGB BLD-MCNC: 9.3 G/DL (ref 12–16)

## 2017-02-14 PROCEDURE — 65270000029 HC RM PRIVATE

## 2017-02-14 PROCEDURE — 74011250637 HC RX REV CODE- 250/637: Performed by: NURSE PRACTITIONER

## 2017-02-14 PROCEDURE — 85018 HEMOGLOBIN: CPT | Performed by: OBSTETRICS & GYNECOLOGY

## 2017-02-14 PROCEDURE — 36415 COLL VENOUS BLD VENIPUNCTURE: CPT | Performed by: OBSTETRICS & GYNECOLOGY

## 2017-02-14 PROCEDURE — 85014 HEMATOCRIT: CPT | Performed by: OBSTETRICS & GYNECOLOGY

## 2017-02-14 RX ORDER — IBUPROFEN 800 MG/1
800 TABLET ORAL
Qty: 90 TAB | Refills: 0 | Status: SHIPPED | OUTPATIENT
Start: 2017-02-14 | End: 2022-08-25

## 2017-02-14 RX ADMIN — IBUPROFEN 800 MG: 400 TABLET, FILM COATED ORAL at 07:38

## 2017-02-14 RX ADMIN — DOCUSATE SODIUM AND SENNOSIDES 1 TABLET: 8.6; 5 TABLET, FILM COATED ORAL at 21:42

## 2017-02-14 RX ADMIN — IBUPROFEN 800 MG: 400 TABLET, FILM COATED ORAL at 21:47

## 2017-02-14 NOTE — PROGRESS NOTES
Post-Partum Day Number 1 Progress Note    David Cho     Assessment:   Hospital Problems  Date Reviewed: 2017          Codes Class Noted POA    IUP (intrauterine pregnancy), incidental ICD-10-CM: Z33.1  ICD-9-CM: V22.2  2017 Unknown            Doing well, post partum day 1    Plan:  1. Continue routine postpartum and perineal care as well as maternal education. 2. The risks and benefits of the circumcision  procedure and anesthesia including: bleeding, infection, variability of cosmetic results were discussed at length with the mother. She is aware that future repeat procedures may be necessary. She gives informed consent to proceed as noted and her questions are answered. 3. Discharge home tomorrow     Information for the patient's :  Gerardo Vargas [[id number]]   Vaginal, Spontaneous Delivery   Patient doing well without significant complaint. Voiding without difficulty, normal lochia. Appetite normal. Breast and bottle feeding. Pain with perineal repair which she is taking percocet and ibuprofen for. Unsure about postpartum birth control. Options reviewed. Current Facility-Administered Medications   Medication Dose Route Frequency    oxytocin (PITOCIN) 30 units/500 mL D5W  0.5-20 anna-units/min IntraVENous TITRATE    gentamicin in Saline (Iso-osm) (GARAMYCIN) 80 mg/100 mL IVPB premix 80 mg  80 mg IntraVENous Q8H       Vitals:  Visit Vitals    /66 (BP 1 Location: Left arm, BP Patient Position: Sitting)    Pulse 96    Temp 99.8 °F (37.7 °C)    Resp 16    Ht 5' 5\" (1.651 m)    Wt 78.9 kg (174 lb)    SpO2 100%    Breastfeeding Unknown    BMI 28.96 kg/m2     Temp (24hrs), Av.8 °F (37.1 °C), Min:97.8 °F (36.6 °C), Max:100.2 °F (37.9 °C)        Exam:   Patient without distress. Abdomen soft, fundus firm, nontender                Perineum with normal lochia noted. Lower extremities are negative for swelling, cords or tenderness.     Labs: Lab Results   Component Value Date/Time    WBC 12.3 02/13/2017 10:15 PM    WBC 7.7 02/13/2017 02:10 AM    HGB 9.3 02/14/2017 02:53 AM    HGB 9.6 02/13/2017 10:15 PM    HGB 10.0 02/13/2017 02:10 AM    HCT 28.7 02/14/2017 02:53 AM    HCT 29.6 02/13/2017 10:15 PM    HCT 30.9 02/13/2017 02:10 AM    PLATELET 500 53/58/0905 10:15 PM    PLATELET 480 75/79/1639 02:10 AM       Recent Results (from the past 24 hour(s))   CBC WITH AUTOMATED DIFF    Collection Time: 02/13/17 10:15 PM   Result Value Ref Range    WBC 12.3 4.6 - 13.2 K/uL    RBC 3.44 (L) 4.20 - 5.30 M/uL    HGB 9.6 (L) 12.0 - 16.0 g/dL    HCT 29.6 (L) 35.0 - 45.0 %    MCV 86.0 74.0 - 97.0 FL    MCH 27.9 24.0 - 34.0 PG    MCHC 32.4 31.0 - 37.0 g/dL    RDW 14.8 (H) 11.6 - 14.5 %    PLATELET 685 (L) 532 - 420 K/uL    MPV 10.4 9.2 - 11.8 FL    NEUTROPHILS 70 42 - 75 %    BAND NEUTROPHILS 13 (H) 0 - 5 %    LYMPHOCYTES 4 (L) 20 - 51 %    MONOCYTES 12 (H) 2 - 9 %    EOSINOPHILS 0 0 - 5 %    BASOPHILS 0 0 - 3 %    METAMYELOCYTES 1 (H) 0 %    ABS. NEUTROPHILS 8.6 (H) 1.8 - 8.0 K/UL    ABS. LYMPHOCYTES 0.5 (L) 0.8 - 3.5 K/UL    ABS. MONOCYTES 1.5 (H) 0 - 1.0 K/UL    ABS. EOSINOPHILS 0.0 0.0 - 0.4 K/UL    ABS.  BASOPHILS 0.0 0.0 - 0.1 K/UL    RBC COMMENTS ANISOCYTOSIS  1+        DF MANUAL     HEMATOCRIT    Collection Time: 02/14/17  2:53 AM   Result Value Ref Range    HCT 28.7 (L) 35.0 - 45.0 %   HEMOGLOBIN    Collection Time: 02/14/17  2:53 AM   Result Value Ref Range    HGB 9.3 (L) 12.0 - 16.0 g/dL

## 2017-02-14 NOTE — PROGRESS NOTES
Delivery Summary    Patient: Jessica Mccray MRN: [id number]  SSN: [id number]    YOB: 1985  Age: 36 y.o. Sex: female        Information for the patient's :  Dilcia PATINO [[id number]]       Labor Events:    Labor:     Rupture Date: 2017   Rupture Time: 6:15 AM   Rupture Type AROM   Amniotic Fluid Volume:      Amniotic Fluid Description: Clear     Induction:         Augmentation:     Labor Events:       Cervical Ripening:           Delivery Events:  Episiotomy:     Laceration(s):       Repaired:      Number of Repair Packets:     Suture Type and Size:       Estimated Blood Loss (ml):  ml       Delivery Date: 2017    Delivery Time: 8:49 PM  Delivery Type:    Sex:  Male     Gestational Age: 37w11d   Delivery Clinician:     Living Status: Yes   Delivery Location: L&D            APGARS  One minute Five minutes Ten minutes   Skin color: 0   1        Heart rate: 2   2        Grimace: 1   2        Muscle tone: 1   2        Breathin   2        Totals: 4   9            Presentation:      Position:        Resuscitation Method:        Meconium Stained:        Cord Vessels:        Cord Events:    Cord Blood Sent?:       Blood Gases Sent?:       Placenta:  Date/Time:    Removal:        Appearance:       [place] Measurements:  Birth Weight: 4.248 kg      Birth Length: 53 cm      Head Circumference:        Chest Circumference:       Abdominal Girth: Other Providers:    , Obstetrician;Primary Nurse;Primary [place] Nurse;Nicu Nurse;Neonatologist;Anesthesiologist;Crna;Nurse Practitioner;Midwife;Nursery Nurse           Labor Events:    Labor:    Rupture Date: 2017   Rupture Time: 6:15 AM   Rupture Type AROM   Amniotic Fluid Volume:      Amniotic Fluid Description: Clear   Induction:        Augmentation:    Labor Events:       Cervical Ripening:        Delivery Events:  Episiotomy: none   Laceration(s):   1st degree vaginal and labial tear extending to perineum   Repaired:   Yes Number of Repair Packets: 1   Suture Type and Size:    2-)Vicryl   Estimated Blood Loss (ml): 350 ml     Spontaneous vaginal delivery of viable male infant at 2049 with one minute shoulder dystocia. Loose nuchal cord times one and body cord. Fetal head delivery at 2048. Nursery called and responded with in one minute. Right shoulder anterior. Spontaneous delivery of intact placenta with 3 vessel cord. Infant with 4/9 apgars. PPV applied. Infant now bonding skin to skin with mother.      Blood Type: O POSITIVE  Rubella: unknown  Group B Strep: No results found for: GRBSEXT;  Treatment was adequate   Orion Castellano, FEMI

## 2017-02-14 NOTE — DISCHARGE SUMMARY
Obstetrical Discharge Summary     Name: Reina Parish MRN: [id number]  SSN: [id number]    YOB: 1985  Age: 36 y.o. Sex: female      Admit Date: 2017    Discharge Date: 2017    Admitting Physician: Thomas Hidalgo MD     Attending Physician:  Thomas Hidalgo MD     Discharge Diagnoses:   Information for the patient's :  Braden Lei [[id number]]   Delivery of a 4.248 kg male infant via Vaginal, Spontaneous Delivery on 2017 at 8:49 PM  by . Apgars were 4 and 9. Additional Diagnoses:   Problem List as of 2017  Date Reviewed: 2017          Codes Class Noted - Resolved    IUP (intrauterine pregnancy), incidental ICD-10-CM: Z33.1  ICD-9-CM: V22.2  2017 - Present            No results found for: RUBELLAEXT, GRBSEXT  Recent Labs      17   0253   [facility] course following the delivery. Patient Instructions:   Current Discharge Medication List      START taking these medications    Details   ibuprofen (MOTRIN) 800 mg tablet Take 1 Tab by mouth every eight (8) hours as needed. Qty: 90 Tab, Refills: 0         CONTINUE these medications which have NOT CHANGED    Details   PNV no.24-iron-folic acid-dha (PRENATAL DHA+COMPLETE PRENATAL) 30975-300 mg-mcg-mg cmpk Take  by mouth. ferrous sulfate ER (IRON) 160 mg (50 mg iron) TbER tablet Take 1 Tab by mouth daily. Reference my discharge instructions. Follow-up Appointments   Procedures    FOLLOW UP VISIT Appointment in: 6 Weeks Please call [facility]) OBGYN at [phone] to schedule 6 week postpartum visit. Please [facility] at [phone] to schedule 6 week postpartum visit.      Standing Status:   Standing     Number of Occurrences:   1     Order Specific Question:   Appointment in     Answer:   6 Weeks        Signed By:  Judy Tristan CNM     February 14, 2017                       BST

## 2017-02-14 NOTE — PROGRESS NOTES
TRANSFER - IN REPORT:    Verbal report received from LEONARD Marcos RN(name) on [place]  being received from L&D(unit) for routine progression of care      Report consisted of patients Situation, Background, Assessment and   Recommendations(SBAR). Information from the following report(s) SBAR, Kardex, Intake/Output, MAR and Recent Results was reviewed with the receiving nurse. Opportunity for questions and clarification was provided. Assessment completed upon patients arrival to unit and care assumed.

## 2017-02-14 NOTE — PROGRESS NOTES
-SBAR report received from CASEY Quispe RN. Pt laying in bed with peanut ball on left side. CNM informed that pt has fever of 100.5. Orders placed for gent and IV tylenol. -CNM at bedside SVE same no change. peanut ball on right side. -CNM BRIGIDA Benitez at bedside with student. [place] removed. -Started pushing with pt.     [id number] out and midwife asking for  Elva and suprapubic.  to a viable baby boy. One minute shoulder. Baby come out and floppy and not breathing. Tactile stimulation. PPV started and pluse ox applied. Called for help rodriguez and nicu nurse. -Pt up with this nurse to restroom, voided and pericare. Gown changed. Placed in wheelchair. Pads weighed 129 grams. -Pt transferred to mother baby SBAR report given to TRAVIS Byers RN.

## 2017-02-15 VITALS
WEIGHT: 174 LBS | TEMPERATURE: 98.7 F | OXYGEN SATURATION: 100 % | SYSTOLIC BLOOD PRESSURE: 105 MMHG | DIASTOLIC BLOOD PRESSURE: 69 MMHG | BODY MASS INDEX: 28.99 KG/M2 | HEIGHT: 65 IN | HEART RATE: 89 BPM | RESPIRATION RATE: 20 BRPM

## 2017-02-15 NOTE — PROGRESS NOTES
Bedside and Verbal shift change report given to CASEY Barton RN (oncoming nurse) by TRAVIS Byers RN (offgoing nurse).  Report included the following information SBAR, Kardex, Intake/Output, MAR and Recent Results.

## 2017-02-15 NOTE — PROGRESS NOTES
Patient was visited by CARLOS. Volunteer followed up with patient and/or family and reported no needs to this . Chaplains will continue to follow and will provide pastoral care as needed or requested. [address], M.Div.   Board Certified   [phone] - Office

## 2017-02-15 NOTE — ANESTHESIA POSTPROCEDURE EVALUATION
Post-Anesthesia Evaluation and Assessment    Cardiovascular Function/Vital Signs  Visit Vitals    /69 (BP 1 Location: Right arm, BP Patient Position: At rest)    Pulse 89    Temp 37.1 °C (98.7 °F)    Resp 20    Ht 5' 5\" (1.651 m)    Wt 78.9 kg (174 lb)    SpO2 100%    Breastfeeding Unknown    BMI 28.96 kg/m2       Patient is status post * No procedures listed *. Nausea/Vomiting: Controlled. Postoperative hydration reviewed and adequate. Pain:  Pain Scale 1: Numeric (0 - 10) (02/15/17 0745)  Pain Intensity 1: 0 (02/15/17 0745)   Managed. Neurological Status:   Neuro (WDL): Within Defined Limits (02/13/17 2100)   At baseline. Mental Status and Level of Consciousness: Baseline and stable. Pulmonary Status:   O2 Device: Room air (02/14/17 2120)   Adequate oxygenation and airway patent. Complications related to anesthesia: None    Post-anesthesia assessment completed. No concerns. Patient has met all discharge requirements.     Signed By: Kary Arroyo MD

## 2017-02-15 NOTE — PROGRESS NOTES
Bedside and Verbal shift change report given to CASEY Barrett RN (oncoming nurse) by TRAVIS Byers RN (offgoing nurse). Report included the following information SBAR, Kardex, Intake/Output, MAR and Recent Results.

## 2017-02-15 NOTE — PROGRESS NOTES
Bedside and Verbal shift change report given to TRAVIS Byers RN (oncoming nurse) by Callum Pina RN (offgoing nurse). Report included the following information SBAR, Kardex, Intake/Output, MAR and Recent Results.

## 2017-02-15 NOTE — PROGRESS NOTES
Patient was visited by [facility] volunteer Zen Carver. Volunteer conducted a Spiritual Care Screening and reported no needs to this . Baby [facility] Card and Spiritual Care literature were provided. Chaplains will continue to follow and will provide pastoral care as needed or requested. [address], M.Div.   Board Certified   [phone] - Office

## 2017-02-15 NOTE — LACTATION NOTE
In shower, was set up with pump last night. 9022 Per mom, nipples are sore and was set up with pump last night. Lanolin cream given. Supply and demand discussed. Discharge teaching completed and support group recommended. Will page for feedings. 1615 Has not paged LC and per RN, mom has only done bottles today.

## 2017-02-16 NOTE — PROGRESS NOTES
Bedside and Verbal shift change report given to KODAK Yan RN (oncoming nurse) by Sally Kingston RN (offgoing nurse). Report given with SBAR, Kardex, MAR and Recent Results.

## 2017-02-16 NOTE — DISCHARGE INSTRUCTIONS
POST DELIVERY DISCHARGE INSTRUCTIONS    Name: Ilda Chu  YOB: 1985  Primary Diagnosis: Active Problems:    IUP (intrauterine pregnancy), incidental (2017)        General:     Diet/Diet Restrictions:  Eight 8-ounce glasses of fluid daily (water, juices); avoid excessive caffeine intake. Meals/snacks as desired which are high in fiber and carbohydrates and low in fat and cholesterol. Physical Activity / Restrictions / Safety:     Avoid heavy lifting, no more that 8 lbs. For 2-3 weeks; limit use of stairs to 2 times daily for the first week home. No driving for one week. Avoid intercourse 4-6 weeks, no douching or tampon use. Check with obstetrician before starting or resuming an exercise program.         Discharge Instructions/Special Treatment/Home Care Needs:     Continue prenatal vitamins. Continue to use squirt bottle with warm water on your episiotomy after each bathroom use until bleeding stops. If steri-strips applied to your incision, remove in 7-10 days. Call your doctor for the following:     Fever over 101 degrees by mouth. Vaginal bleeding heavier than a normal menstrual period or clot larger than a golf ball. Red streaks or increased swelling of legs, painful red streaks on your breast.  Painful urination, constipation and increased pain or swelling or discharge with your incision. If you feel extremely anxious or overwhelmed. If you have thoughts of harming yourself and/or your baby. Pain Management:     Pain Management:   Take Acetaminophen (Tylenol) or Ibuprofen (Advil, Motrin), as directed for pain. Use a warm Sitz bath 3 times daily to relieve episiotomy or hemorrhoidal discomfort. Heating pad to  incision as needed. For hemorrhoidal discomfort, use Tucks and Anusol cream as needed and directed. Follow-Up Care:      These are general instructions for a healthy lifestyle:    No smoking/ No tobacco products/ Avoid exposure to second hand smoke    Surgeon General's Warning:  Quitting smoking now greatly reduces serious risk to your health. Obesity, smoking, and sedentary lifestyle greatly increases your risk for illness    A healthy diet, regular physical exercise & weight monitoring are important for maintaining a healthy lifestyle    Recognize signs and symptoms of STROKE:    F-face looks uneven    A-arms unable to move or move unevenly    S-speech slurred or non-existent    T-time-call 911 as soon as signs and symptoms begin-DO NOT go       Back to bed or wait to see if you get better-TIME IS BRAIN. Signed By: Meenu Son RN                                                                                                   Date: 2/15/2017 Time: 4:05 PM         After Your Delivery (the Postpartum Period): Care Instructions  Your Care Instructions  Congratulations on the birth of your baby. Like pregnancy, the  period can be a time of excitement, edgar, and exhaustion. You may look at your wondrous little baby and feel happy. You may also be overwhelmed by your new sleep hours and new responsibilities. At first, babies often sleep during the days and are awake at night. They do not have a pattern or routine. They may make sudden gasps, jerk themselves awake, or look like they have crossed eyes. These are all normal, and they may even make you smile. In these first weeks after delivery, try to take good care of yourself. It may take 4 to 6 weeks to feel like yourself again, and possibly longer if you had a  birth. You will likely feel very tired for several weeks. Your days will be full of ups and downs, but lots of edgar as well. Follow-up care is a key part of your treatment and safety. Be sure to make and go to all appointments, and call your doctor if you are having problems. It's also a good idea to know your test results and keep a list of the medicines you take. How can you care for yourself at home?   Take care of your body after delivery  · Use pads instead of tampons for the bloody flow that may last as long as 2 weeks. · Ease cramps with ibuprofen (Advil, Motrin). · Ease soreness of hemorrhoids and the area between your vagina and rectum with ice compresses or witch hazel pads. · Ease constipation by drinking lots of fluid and eating high-fiber foods. Ask your doctor about over-the-counter stool softeners. · Cleanse yourself with a gentle squeeze of warm water from a bottle instead of wiping with toilet paper. · Take a sitz bath in warm water several times a day. · Wear a good nursing bra. Ease sore and swollen breasts with warm, wet washcloths. · If you are not breastfeeding, use ice rather than heat for breast soreness. · Your period may not start for several months if you are breastfeeding. You may bleed more, and longer at first, than you did before you got pregnant. · Wait until you are healed (about 4 to 6 weeks) before you have sexual intercourse. Your doctor will tell you when it is okay to have sex. · Try not to travel with your baby for 5 or 6 weeks. If you take a long car trip, make frequent stops to walk around and stretch. Avoid exhaustion  · Rest every day. Try to nap when your baby naps. · Ask another adult to be with you for a few days after delivery. · Plan for  if you have other children. · Stay flexible so you can eat at odd hours and sleep when you need to. Both you and your baby are making new schedules. · Plan small trips to get out of the house. Change can make you feel less tired. · Ask for help with housework, cooking, and shopping. Remind yourself that your job is to care for your baby. Know about help for postpartum depression  · \"Baby blues\" are common for the first 1 to 2 weeks after birth. You may cry or feel sad or irritable for no reason. · Rest whenever you can. Being tired makes it harder to handle your emotions. · Go for walks with your baby.   · Talk to your partner, friends, and family about your feelings. · If your symptoms last for more than a few weeks, or if you feel very depressed, ask your doctor for help. · Postpartum depression can be treated. Support groups and counseling can help. Sometimes medicine can also help. Stay healthy  · Eat healthy foods so you have more energy, make good breast milk, and lose extra baby pounds. · If you breastfeed, avoid alcohol and drugs. Stay smoke-free. If you quit during pregnancy, congratulations. · Start daily exercise after 4 to 6 weeks, but rest when you feel tired. · Learn exercises to tone your belly. Do Kegel exercises to regain strength in your pelvic muscles. You can do these exercises while you stand or sit. ¨ Squeeze the same muscles you would use to stop your urine. Your belly and thighs should not move. ¨ Hold the squeeze for 3 seconds, and then relax for 3 seconds. ¨ Start with 3 seconds. Then add 1 second each week until you are able to squeeze for 10 seconds. ¨ Repeat the exercise 10 to 15 times for each session. Do three or more sessions each day. · Find a class for new mothers and new babies that has an exercise time. · If you had a  birth, give yourself a bit more time before you exercise, and be careful. When should you call for help? Call 911 anytime you think you may need emergency care. For example, call if:  · You passed out (lost consciousness). Call your doctor now or seek immediate medical care if:  · You have severe vaginal bleeding. This means you are passing blood clots and soaking through a pad each hour for 2 or more hours. · You are dizzy or lightheaded, or you feel like you may faint. · You have a fever. · You have new belly pain, or your pain gets worse. Watch closely for changes in your health, and be sure to contact your doctor if:  · Your vaginal bleeding seems to be getting heavier. · You have new or worse vaginal discharge.   · You feel sad, anxious, or hopeless for more than a few days. · You do not get better as expected. Where can you learn more? Go to http://hannah-madhavi.info/. Enter A461 in the search box to learn more about \"After Your Delivery (the Postpartum Period): Care Instructions. \"  Current as of: May 30, 2016  Content Version: 11.1  © 1777-2158 [employer]. Care instructions adapted under license by [employer] (which disclaims liability or warranty for this information). If you have questions about a medical condition or this instruction, always ask your healthcare professional. [place] [address] any warranty or liability for your use of this information. Patient armband removed and given to patient to take home.   Patient was informed of the privacy risks if armband lost or stolen

## 2018-07-19 LAB
ANTIBODY SCREEN, EXTERNAL: NEGATIVE
CHLAMYDIA, EXTERNAL: NEGATIVE
HBSAG, EXTERNAL: NEGATIVE
HIV, EXTERNAL: NEGATIVE
N. GONORRHEA, EXTERNAL: NEGATIVE
RPR, EXTERNAL: NORMAL
RUBELLA, EXTERNAL: NORMAL
TYPE, ABO & RH, EXTERNAL: NORMAL

## 2018-11-19 LAB — GRBS, EXTERNAL: NEGATIVE

## 2018-12-22 ENCOUNTER — ANESTHESIA EVENT (OUTPATIENT)
Dept: LABOR AND DELIVERY | Age: 33
End: 2018-12-22
Payer: MEDICAID

## 2018-12-22 ENCOUNTER — HOSPITAL ENCOUNTER (INPATIENT)
Age: 33
LOS: 1 days | Discharge: HOME | End: 2018-12-24
Attending: OBSTETRICS & GYNECOLOGY | Admitting: OBSTETRICS & GYNECOLOGY
Payer: MEDICAID

## 2018-12-22 ENCOUNTER — ANESTHESIA (OUTPATIENT)
Dept: LABOR AND DELIVERY | Age: 33
End: 2018-12-22
Payer: MEDICAID

## 2018-12-22 LAB
ABO + RH BLD: NORMAL
BASOPHILS # BLD: 0 K/UL (ref 0–0.1)
BASOPHILS NFR BLD: 0 % (ref 0–2)
BLOOD GROUP ANTIBODIES SERPL: NORMAL
DIFFERENTIAL METHOD BLD: ABNORMAL
EOSINOPHIL # BLD: 0.2 K/UL (ref 0–0.4)
EOSINOPHIL NFR BLD: 2 % (ref 0–5)
ERYTHROCYTE [DISTWIDTH] IN BLOOD BY AUTOMATED COUNT: 13.7 % (ref 11.6–14.5)
HCT VFR BLD AUTO: 35.2 % (ref 35–45)
HCT VFR BLD AUTO: 37.6 % (ref 35–45)
HGB BLD-MCNC: 11.6 G/DL (ref 12–16)
HGB BLD-MCNC: 12.3 G/DL (ref 12–16)
LYMPHOCYTES # BLD: 2.1 K/UL (ref 0.9–3.6)
LYMPHOCYTES NFR BLD: 28 % (ref 21–52)
MCH RBC QN AUTO: 31.7 PG (ref 24–34)
MCHC RBC AUTO-ENTMCNC: 32.7 G/DL (ref 31–37)
MCV RBC AUTO: 96.9 FL (ref 74–97)
MONOCYTES # BLD: 0.7 K/UL (ref 0.05–1.2)
MONOCYTES NFR BLD: 9 % (ref 3–10)
NEUTS SEG # BLD: 4.4 K/UL (ref 1.8–8)
NEUTS SEG NFR BLD: 61 % (ref 40–73)
PLATELET # BLD AUTO: 171 K/UL (ref 135–420)
PMV BLD AUTO: 11.2 FL (ref 9.2–11.8)
RBC # BLD AUTO: 3.88 M/UL (ref 4.2–5.3)
SPECIMEN EXP DATE BLD: NORMAL
WBC # BLD AUTO: 7.3 K/UL (ref 4.6–13.2)

## 2018-12-22 PROCEDURE — 65270000029 HC RM PRIVATE

## 2018-12-22 PROCEDURE — 0KQM0ZZ REPAIR PERINEUM MUSCLE, OPEN APPROACH: ICD-10-PCS | Performed by: OBSTETRICS & GYNECOLOGY

## 2018-12-22 PROCEDURE — 77030007879 HC KT SPN EPDRL TELE -B: Performed by: SPECIALIST

## 2018-12-22 PROCEDURE — 85025 COMPLETE CBC W/AUTO DIFF WBC: CPT

## 2018-12-22 PROCEDURE — 85018 HEMOGLOBIN: CPT

## 2018-12-22 PROCEDURE — 59025 FETAL NON-STRESS TEST: CPT

## 2018-12-22 PROCEDURE — 85014 HEMATOCRIT: CPT

## 2018-12-22 PROCEDURE — 74011250636 HC RX REV CODE- 250/636: Performed by: OBSTETRICS & GYNECOLOGY

## 2018-12-22 PROCEDURE — 74011250636 HC RX REV CODE- 250/636

## 2018-12-22 PROCEDURE — 76060000078 HC EPIDURAL ANESTHESIA

## 2018-12-22 PROCEDURE — 74011250637 HC RX REV CODE- 250/637

## 2018-12-22 PROCEDURE — 36415 COLL VENOUS BLD VENIPUNCTURE: CPT

## 2018-12-22 PROCEDURE — 75410000003 HC RECOV DEL/VAG/CSECN EA 0.5 HR

## 2018-12-22 PROCEDURE — 75410000000 HC DELIVERY VAGINAL/SINGLE

## 2018-12-22 PROCEDURE — 00HU33Z INSERTION OF INFUSION DEVICE INTO SPINAL CANAL, PERCUTANEOUS APPROACH: ICD-10-PCS | Performed by: SPECIALIST

## 2018-12-22 PROCEDURE — 99281 EMR DPT VST MAYX REQ PHY/QHP: CPT

## 2018-12-22 PROCEDURE — 74011250636 HC RX REV CODE- 250/636: Performed by: SPECIALIST

## 2018-12-22 PROCEDURE — 74011250637 HC RX REV CODE- 250/637: Performed by: OBSTETRICS & GYNECOLOGY

## 2018-12-22 PROCEDURE — 75410000002 HC LABOR FEE PER 1 HR

## 2018-12-22 PROCEDURE — 86901 BLOOD TYPING SEROLOGIC RH(D): CPT

## 2018-12-22 PROCEDURE — 3E0R3BZ INTRODUCTION OF ANESTHETIC AGENT INTO SPINAL CANAL, PERCUTANEOUS APPROACH: ICD-10-PCS | Performed by: SPECIALIST

## 2018-12-22 PROCEDURE — 77010026065 HC OXYGEN MINIMUM MEDICAL AIR

## 2018-12-22 RX ORDER — IBUPROFEN 400 MG/1
800 TABLET ORAL
Status: DISCONTINUED | OUTPATIENT
Start: 2018-12-22 | End: 2018-12-24 | Stop reason: HOSPADM

## 2018-12-22 RX ORDER — METHYLERGONOVINE MALEATE 0.2 MG/ML
0.2 INJECTION INTRAVENOUS AS NEEDED
Status: COMPLETED | OUTPATIENT
Start: 2018-12-22 | End: 2018-12-22

## 2018-12-22 RX ORDER — MINERAL OIL
OIL (ML) ORAL
Status: COMPLETED
Start: 2018-12-22 | End: 2018-12-22

## 2018-12-22 RX ORDER — NALBUPHINE HYDROCHLORIDE 10 MG/ML
10 INJECTION, SOLUTION INTRAMUSCULAR; INTRAVENOUS; SUBCUTANEOUS
Status: DISCONTINUED | OUTPATIENT
Start: 2018-12-22 | End: 2018-12-22 | Stop reason: HOSPADM

## 2018-12-22 RX ORDER — MISOPROSTOL 100 UG/1
TABLET ORAL
Status: COMPLETED
Start: 2018-12-22 | End: 2018-12-22

## 2018-12-22 RX ORDER — OXYTOCIN/RINGER'S LACTATE 20/1000 ML
PLASTIC BAG, INJECTION (ML) INTRAVENOUS
Status: COMPLETED
Start: 2018-12-22 | End: 2018-12-22

## 2018-12-22 RX ORDER — AMOXICILLIN 250 MG
1 CAPSULE ORAL
Status: DISCONTINUED | OUTPATIENT
Start: 2018-12-22 | End: 2018-12-24 | Stop reason: HOSPADM

## 2018-12-22 RX ORDER — FENTANYL/ROPIVACAINE/NS/PF 2MCG/ML-.1
PLASTIC BAG, INJECTION (ML) EPIDURAL
Status: DISPENSED
Start: 2018-12-22 | End: 2018-12-22

## 2018-12-22 RX ORDER — PROMETHAZINE HYDROCHLORIDE 25 MG/ML
25 INJECTION, SOLUTION INTRAMUSCULAR; INTRAVENOUS
Status: DISCONTINUED | OUTPATIENT
Start: 2018-12-22 | End: 2018-12-24 | Stop reason: HOSPADM

## 2018-12-22 RX ORDER — FENTANYL/ROPIVACAINE/NS/PF 2MCG/ML-.1
1-15 PLASTIC BAG, INJECTION (ML) EPIDURAL
Status: DISCONTINUED | OUTPATIENT
Start: 2018-12-22 | End: 2018-12-22 | Stop reason: HOSPADM

## 2018-12-22 RX ORDER — LIDOCAINE HYDROCHLORIDE 10 MG/ML
20 INJECTION, SOLUTION EPIDURAL; INFILTRATION; INTRACAUDAL; PERINEURAL AS NEEDED
Status: DISCONTINUED | OUTPATIENT
Start: 2018-12-22 | End: 2018-12-22 | Stop reason: HOSPADM

## 2018-12-22 RX ORDER — NALOXONE HYDROCHLORIDE 0.4 MG/ML
0.2 INJECTION, SOLUTION INTRAMUSCULAR; INTRAVENOUS; SUBCUTANEOUS AS NEEDED
Status: DISCONTINUED | OUTPATIENT
Start: 2018-12-22 | End: 2018-12-22 | Stop reason: HOSPADM

## 2018-12-22 RX ORDER — TERBUTALINE SULFATE 1 MG/ML
0.25 INJECTION SUBCUTANEOUS
Status: DISCONTINUED | OUTPATIENT
Start: 2018-12-22 | End: 2018-12-22 | Stop reason: HOSPADM

## 2018-12-22 RX ORDER — OXYCODONE AND ACETAMINOPHEN 5; 325 MG/1; MG/1
2 TABLET ORAL
Status: DISCONTINUED | OUTPATIENT
Start: 2018-12-22 | End: 2018-12-24 | Stop reason: HOSPADM

## 2018-12-22 RX ORDER — SODIUM CHLORIDE 0.9 % (FLUSH) 0.9 %
5-10 SYRINGE (ML) INJECTION AS NEEDED
Status: DISCONTINUED | OUTPATIENT
Start: 2018-12-22 | End: 2018-12-24 | Stop reason: HOSPADM

## 2018-12-22 RX ORDER — HYDROMORPHONE HYDROCHLORIDE 1 MG/ML
1 INJECTION, SOLUTION INTRAMUSCULAR; INTRAVENOUS; SUBCUTANEOUS
Status: DISCONTINUED | OUTPATIENT
Start: 2018-12-22 | End: 2018-12-22 | Stop reason: HOSPADM

## 2018-12-22 RX ORDER — ZOLPIDEM TARTRATE 5 MG/1
5 TABLET ORAL
Status: DISCONTINUED | OUTPATIENT
Start: 2018-12-22 | End: 2018-12-24 | Stop reason: HOSPADM

## 2018-12-22 RX ORDER — BUTORPHANOL TARTRATE 2 MG/ML
2 INJECTION INTRAMUSCULAR; INTRAVENOUS
Status: DISCONTINUED | OUTPATIENT
Start: 2018-12-22 | End: 2018-12-22 | Stop reason: HOSPADM

## 2018-12-22 RX ORDER — LIDOCAINE HYDROCHLORIDE 20 MG/ML
INJECTION, SOLUTION EPIDURAL; INFILTRATION; INTRACAUDAL; PERINEURAL AS NEEDED
Status: DISCONTINUED | OUTPATIENT
Start: 2018-12-22 | End: 2018-12-22 | Stop reason: HOSPADM

## 2018-12-22 RX ORDER — SODIUM CHLORIDE, SODIUM LACTATE, POTASSIUM CHLORIDE, CALCIUM CHLORIDE 600; 310; 30; 20 MG/100ML; MG/100ML; MG/100ML; MG/100ML
125 INJECTION, SOLUTION INTRAVENOUS CONTINUOUS
Status: DISCONTINUED | OUTPATIENT
Start: 2018-12-22 | End: 2018-12-22 | Stop reason: HOSPADM

## 2018-12-22 RX ORDER — LIDOCAINE HYDROCHLORIDE 10 MG/ML
INJECTION INFILTRATION; PERINEURAL
Status: DISCONTINUED
Start: 2018-12-22 | End: 2018-12-22 | Stop reason: WASHOUT

## 2018-12-22 RX ORDER — MINERAL OIL
30 OIL (ML) ORAL AS NEEDED
Status: DISCONTINUED | OUTPATIENT
Start: 2018-12-22 | End: 2018-12-22 | Stop reason: HOSPADM

## 2018-12-22 RX ORDER — FENTANYL CITRATE 50 UG/ML
INJECTION, SOLUTION INTRAMUSCULAR; INTRAVENOUS
Status: COMPLETED
Start: 2018-12-22 | End: 2018-12-22

## 2018-12-22 RX ORDER — ACETAMINOPHEN 325 MG/1
650 TABLET ORAL
Status: DISCONTINUED | OUTPATIENT
Start: 2018-12-22 | End: 2018-12-24 | Stop reason: HOSPADM

## 2018-12-22 RX ORDER — FENTANYL CITRATE 50 UG/ML
INJECTION, SOLUTION INTRAMUSCULAR; INTRAVENOUS AS NEEDED
Status: DISCONTINUED | OUTPATIENT
Start: 2018-12-22 | End: 2018-12-22 | Stop reason: HOSPADM

## 2018-12-22 RX ORDER — SODIUM CHLORIDE 0.9 % (FLUSH) 0.9 %
5-10 SYRINGE (ML) INJECTION EVERY 8 HOURS
Status: DISCONTINUED | OUTPATIENT
Start: 2018-12-22 | End: 2018-12-24 | Stop reason: HOSPADM

## 2018-12-22 RX ORDER — DIPHENHYDRAMINE HYDROCHLORIDE 50 MG/ML
25 INJECTION, SOLUTION INTRAMUSCULAR; INTRAVENOUS
Status: DISCONTINUED | OUTPATIENT
Start: 2018-12-22 | End: 2018-12-22 | Stop reason: HOSPADM

## 2018-12-22 RX ADMIN — SODIUM CHLORIDE, SODIUM LACTATE, POTASSIUM CHLORIDE, AND CALCIUM CHLORIDE 1000 ML: 600; 310; 30; 20 INJECTION, SOLUTION INTRAVENOUS at 01:20

## 2018-12-22 RX ADMIN — OXYCODONE AND ACETAMINOPHEN 2 TABLET: 5; 325 TABLET ORAL at 23:19

## 2018-12-22 RX ADMIN — ACETAMINOPHEN 650 MG: 325 TABLET ORAL at 09:27

## 2018-12-22 RX ADMIN — SODIUM CHLORIDE, SODIUM LACTATE, POTASSIUM CHLORIDE, AND CALCIUM CHLORIDE 125 ML/HR: 600; 310; 30; 20 INJECTION, SOLUTION INTRAVENOUS at 04:27

## 2018-12-22 RX ADMIN — METHYLERGONOVINE MALEATE 0.2 MG: 0.2 INJECTION INTRAVENOUS at 06:48

## 2018-12-22 RX ADMIN — IBUPROFEN 800 MG: 400 TABLET ORAL at 23:15

## 2018-12-22 RX ADMIN — Medication 20000 MILLI-UNITS: at 06:41

## 2018-12-22 RX ADMIN — LIDOCAINE HYDROCHLORIDE 8 ML: 20 INJECTION, SOLUTION EPIDURAL; INFILTRATION; INTRACAUDAL; PERINEURAL at 05:17

## 2018-12-22 RX ADMIN — SODIUM CHLORIDE, SODIUM LACTATE, POTASSIUM CHLORIDE, AND CALCIUM CHLORIDE 300 ML: 600; 310; 30; 20 INJECTION, SOLUTION INTRAVENOUS at 05:13

## 2018-12-22 RX ADMIN — IBUPROFEN 800 MG: 400 TABLET ORAL at 09:27

## 2018-12-22 RX ADMIN — MISOPROSTOL 1000 MCG: 100 TABLET ORAL at 06:48

## 2018-12-22 RX ADMIN — ROPIVACAINE HYDROCHLORIDE 10 ML/HR: 10 INJECTION, SOLUTION EPIDURAL at 02:16

## 2018-12-22 RX ADMIN — FENTANYL CITRATE 100 MCG: 50 INJECTION, SOLUTION INTRAMUSCULAR; INTRAVENOUS at 02:16

## 2018-12-22 RX ADMIN — Medication 30 ML: at 06:35

## 2018-12-22 RX ADMIN — ACETAMINOPHEN 650 MG: 325 TABLET ORAL at 15:03

## 2018-12-22 RX ADMIN — SODIUM CHLORIDE, SODIUM LACTATE, POTASSIUM CHLORIDE, AND CALCIUM CHLORIDE 125 ML/HR: 600; 310; 30; 20 INJECTION, SOLUTION INTRAVENOUS at 01:46

## 2018-12-22 NOTE — PROGRESS NOTES
0045-Pt arrives to unit with c/o ctx 2 mins apart. Pt taken to rm7. TOCO and US applied. 0050-SVE 6/70/-2, bulging bag    0055-Dr. Juana Torrez at bedside, abd US to confirm presentation, fetus is vertex position. SVE 6/60/-2, admission orders given at this time. 0125-Pt requesting epidural at this time. Paged Dr. Edd Lai via [facility] [employer] system    0130-Dr. Edd Lai returned page and is on his way to the unit. Debbie Land at bedside for epidural placement  0207-Timeout complete  0212-Test dose given  0215-100 mcg Fentanyl given to Dr. Nati Kraus for bolus  0215-Bolus dose given  0216-Epidural complete    0245-Position change, right lateral w peanut ball    0310-Lappinen at bedside, SVE 8/100/0 SROM at this time, large amount of clear fluid. Pt in high fowlers with feet lowered. 0405-Position change, lateral left with peanut ball    0500-Straight cath performed by RN    0625-Wyatt at bedside, SVE 10/100/+1  0632-Pt start pushing at this time  0637- viable baby boy, Apgars 8/9, , deep sulcus tear repaired x3 packets. Pain reported 0/10 on pain scale. 0715-Bedside and verbal shift change report given to RAY Schuler (oncoming nurse) by BRIGIDA Connell RN (offgoing nurse). Report included the following information SBAR, Kardex and MAR.

## 2018-12-22 NOTE — ANESTHESIA PROCEDURE NOTES
Epidural Block    Start time: 12/22/2018 2:05 AM  End time: 12/22/2018 2:16 AM  Performed by: Jamison Erazo MD  Authorized by: Jamison Erazo MD     Pre-Procedure  Indication: labor epidural    Preanesthetic Checklist: patient identified, risks and benefits discussed, anesthesia consent, site marked, patient being monitored, timeout performed and anesthesia consent    Timeout Time: 02:07        Epidural:   Patient position:  Seated  Prep region:  Lumbar  Prep: Betadine and Patient draped    Location:  L3-4    Needle and Epidural Catheter:   Needle Type:  Tuohy  Needle Gauge:  17 G  Injection Technique:  Loss of resistance using saline  Attempts:  2  Catheter Size:  19 G  Catheter at Skin Depth (cm):  10  Depth in Epidural Space (cm):  5  Events: no blood with aspiration, no cerebrospinal fluid with aspiration, no paresthesia and negative aspiration test    Test Dose:  Negative    Assessment:   Catheter Secured:  Tegaderm and tape  Insertion:  Uncomplicated  Patient tolerance:  Patient tolerated the procedure well with no immediate complications  Ultrasound prescan  Patient had difficult time holding still.

## 2018-12-22 NOTE — ANESTHESIA PREPROCEDURE EVALUATION
Anesthetic History   No history of anesthetic complications            Review of Systems / Medical History  Patient summary reviewed, nursing notes reviewed and pertinent labs reviewed    Pulmonary  Within defined limits                 Neuro/Psych   Within defined limits           Cardiovascular  Within defined limits                Exercise tolerance: >4 METS     GI/Hepatic/Renal  Within defined limits              Endo/Other  Within defined limits           Other Findings              Physical Exam    Airway  Mallampati: II  TM Distance: 4 - 6 cm  Neck ROM: normal range of motion   Mouth opening: Normal     Cardiovascular               Dental  No notable dental hx       Pulmonary                 Abdominal         Other Findings            Anesthetic Plan    ASA: 2  Anesthesia type: epidural      Post-op pain plan if not by surgeon: indwelling epidural catheter      Anesthetic plan and risks discussed with: Patient and Spouse      Risks/benefits explained: infection, nerve injury, bleeding, headache, back pain, failed epidural - she wishes to proceed.

## 2018-12-22 NOTE — L&D DELIVERY NOTE
Delivery Note    Obstetrician:  Daniel Nielson MD    Pre-Delivery Diagnosis: Term pregnancy    Post-Delivery Diagnosis: Living  infant(s)    Intrapartum Event: Non reassuring fetal hear tones    Procedure: Spontaneous vaginal delivery    Epidural: YES    Estimated Blood Loss: 800    Episiotomy: n/a    Laceration(s):  Deep right vaginal sulcal tear    Laceration(s) repair: YES; 3x 2.0 chromic    Presentation: Cephalic    Fetal Description: avilez    Fetal Position: Occiput Anterior    Birth Weight: 3380 grams    Apgar - One Minute:9    Apgar - Five Minutes: 9    Umbilical Cord: 3 vessels present    Specimens: n/a           Complications:  Postpartum hemorrhage           Cord Blood Results:   Information for the patient's :  Chase Ludwig [[id number]]     Lab Results   Component Value Date/Time    NAZANIN IgG NEG 2018 06:37 AM    ABO/Rh(D) Annemarie Nnamdi POSITIVE 2018 06:37 AM     Prenatal Labs:     Lab Results   Component Value Date/Time    ABO/Rh(D) Annemarie Gracek POSITIVE 2018 01:00 AM    HBsAg, External Negative 2018    HIV, External Negative 2018    Rubella, External Immune 2018    RPR, External Non-reactive 2018    Gonorrhea, External Negative 2018    Chlamydia, External Negative 2018    GrBStrep, External Negative 2018        Attending Attestation: I was present and scrubbed for the entire procedure    Signed By:  Daniel Nielson MD     2018

## 2018-12-22 NOTE — PROGRESS NOTES
0715 Bedside and Verbal shift change report given to RAY Osborne (oncoming nurse) by BRIGIDA Johnson RN (offgoing nurse). Report included the following information SBAR, Kardex, Procedure Summary, Intake/Output, MAR, Accordion, Recent Results, Med Rec Status and Quality Measures. 1130 TRANSFER - OUT REPORT:    Verbal report given to BRIGIDA Barreto RN (name) on Deepa Benitez  being transferred to Mother baby (unit) for routine progression of care       Report consisted of patients Situation, Background, Assessment and   Recommendations(SBAR). Information from the following report(s) SBAR, Kardex, Intake/Output, MAR, Accordion, Recent Results, Med Rec Status and Quality Measures was reviewed with the receiving nurse. Lines:   Peripheral IV 12/22/18 Left;Posterior Hand (Active)   Site Assessment Clean, dry, & intact 12/22/2018  3:04 PM   Phlebitis Assessment 0 12/22/2018  3:04 PM   Infiltration Assessment 0 12/22/2018  3:04 PM   Dressing Status Clean, dry, & intact 12/22/2018  3:04 PM   Dressing Type Transparent 12/22/2018  3:04 PM   Hub Color/Line Status Green 12/22/2018  3:04 PM   Action Taken Open ports on tubing capped 12/22/2018  1:19 AM   Alcohol Cap Used Yes 12/22/2018  1:19 AM        Opportunity for questions and clarification was provided.       Patient transported with:   Registered Nurse

## 2018-12-22 NOTE — PROGRESS NOTES
Received patient from L&D. Received bedside report from loly borja RN,  Assessment completed, oriented to room, call bell, white board. Instructed to call for increased pain, bleeding, or to void. Reviewed admission folder and its contents with patient. Questions answered.

## 2018-12-22 NOTE — PROGRESS NOTES
OB Progress Note    S: Pt reports pain moderate    O:   Patient Vitals for the past 4 hrs:   Temp Pulse BP SpO2   12/22/18 0102 98.2 °F (36.8 °C) 90 114/73 100 %            VE: 8/100/0; SROM       TOCO: irregular       FHT: category 1        Presentation:  A/P: IUP at  [id number]  GBS-                        Lauri Coombs MD  December 22, 2018

## 2018-12-22 NOTE — H&P
History & Physical    Name: Zuleyma Olmstead MRN: [id number]  SSN: [id number]    YOB: 1985  Age: 34 y.o. Sex: female        Subjective:     Estimated Date of Delivery: 18  OB History      Para Term  AB Living    3 2 1     1    SAB TAB Ectopic Molar Multiple Live Births            0 1          MsCristofer Garcia is admitted with pregnancy at 39w0d for active labor. Prenatal course was normal.Prenatal care has been followed by [facility]. Please see prenatal records for details. Past Medical History:   Diagnosis Date    Anemia      No past surgical history on file. Social History     Occupational History    Not on file   Tobacco Use    Smoking status: Never Smoker   Substance and Sexual Activity    Alcohol use: No    Drug use: No    Sexual activity: Not on file     No family history on file. No Known Allergies  Prior to Admission medications    Medication Sig Start Date End Date Taking? Authorizing Provider   ibuprofen (MOTRIN) 800 mg tablet Take 1 Tab by mouth every eight (8) hours as needed. 17   MIA Bruce   PNV no.24-iron-folic acid-dha (PRENATAL DHA+COMPLETE PRENATAL) 30977-940 mg-mcg-mg cmpk Take  by mouth. Provider, Historical   ferrous sulfate ER (IRON) 160 mg (50 mg iron) TbER tablet Take 1 Tab by mouth daily. Provider, Historical        Review of Systems: A comprehensive review of systems was negative except for that written in the HPI. Objective:     Vitals: There were no vitals filed for this visit.      Physical Exam:  Cvx: 6/60/-2  Membranes: intact  Fetal Heart Rate:category 1    Prenatal Labs:   No results found for: RUBELLAEXT, GRBSEXT, HBSAGEXT, HIVEXT, RPREXT, GONNOEXT, CHLAMEXT      Assessment/Plan:     Plan: Admit for labor; gbs-    Signed By:  Ganga Woo MD     2018

## 2018-12-23 LAB
HCT VFR BLD AUTO: 28.9 % (ref 35–45)
HGB BLD-MCNC: 9.7 G/DL (ref 12–16)

## 2018-12-23 PROCEDURE — 85014 HEMATOCRIT: CPT

## 2018-12-23 PROCEDURE — 36415 COLL VENOUS BLD VENIPUNCTURE: CPT

## 2018-12-23 PROCEDURE — 74011250637 HC RX REV CODE- 250/637: Performed by: OBSTETRICS & GYNECOLOGY

## 2018-12-23 RX ORDER — LIDOCAINE HYDROCHLORIDE 10 MG/ML
1 INJECTION, SOLUTION EPIDURAL; INFILTRATION; INTRACAUDAL; PERINEURAL ONCE
Status: ACTIVE | OUTPATIENT
Start: 2018-12-23 | End: 2018-12-24

## 2018-12-23 RX ADMIN — OXYCODONE AND ACETAMINOPHEN 2 TABLET: 5; 325 TABLET ORAL at 23:15

## 2018-12-23 RX ADMIN — IBUPROFEN 800 MG: 400 TABLET ORAL at 17:41

## 2018-12-23 RX ADMIN — OXYCODONE AND ACETAMINOPHEN 2 TABLET: 5; 325 TABLET ORAL at 07:53

## 2018-12-23 RX ADMIN — IBUPROFEN 800 MG: 400 TABLET ORAL at 07:53

## 2018-12-23 RX ADMIN — OXYCODONE AND ACETAMINOPHEN 2 TABLET: 5; 325 TABLET ORAL at 17:41

## 2018-12-23 NOTE — PROGRESS NOTES
Bassem 1521 care of pt, report received from BRIGIDA Marley RN. Pt updated on plan of care. Visitors present, no complaints at this time. 2050- Rounded on pt, infant returned to beside, pt aware. Pt complains of pain but requests medication at later time when she is ready for bed. Pt encouraged to call nursing staff when she is ready.

## 2018-12-23 NOTE — PROGRESS NOTES
Bedside and Verbal shift change report given to ISIAH Saha RN (oncoming nurse) by Nancy Del Real RN (offgoing nurse). Report included the following information Kardex, Intake/Output and MAR.

## 2018-12-23 NOTE — PROGRESS NOTES
Bedside and Verbal shift change report given to BRIGIDA Silveira and ISIAH Coleman (oncoming nurse) by Epifanio Tan (offgoing nurse). Report included the following information SBAR, Kardex. Intake and Output, MAR and recent results.

## 2018-12-23 NOTE — PROGRESS NOTES
Bedside and Verbal shift change report given to Jeferson Mullen RN (oncoming nurse) by Cirilo Oconnor RN   (offgoing nurse). Report given with SBAR and Kardex.

## 2018-12-24 VITALS
BODY MASS INDEX: 30.32 KG/M2 | HEIGHT: 65 IN | SYSTOLIC BLOOD PRESSURE: 126 MMHG | OXYGEN SATURATION: 100 % | TEMPERATURE: 98.4 F | WEIGHT: 182 LBS | DIASTOLIC BLOOD PRESSURE: 87 MMHG | HEART RATE: 78 BPM | RESPIRATION RATE: 18 BRPM

## 2018-12-24 PROBLEM — Z34.90 PREGNANT: Status: ACTIVE | Noted: 2018-12-24

## 2018-12-24 PROCEDURE — 74011250637 HC RX REV CODE- 250/637: Performed by: OBSTETRICS & GYNECOLOGY

## 2018-12-24 PROCEDURE — 65270000029 HC RM PRIVATE

## 2018-12-24 RX ADMIN — IBUPROFEN 800 MG: 400 TABLET ORAL at 03:36

## 2018-12-24 RX ADMIN — DOCUSATE SODIUM AND SENNOSIDES 1 TABLET: 8.6; 5 TABLET, FILM COATED ORAL at 03:36

## 2018-12-24 RX ADMIN — OXYCODONE AND ACETAMINOPHEN 2 TABLET: 5; 325 TABLET ORAL at 08:19

## 2018-12-24 RX ADMIN — IBUPROFEN 800 MG: 400 TABLET ORAL at 12:36

## 2018-12-24 RX ADMIN — DOCUSATE SODIUM AND SENNOSIDES 1 TABLET: 8.6; 5 TABLET, FILM COATED ORAL at 08:31

## 2018-12-24 NOTE — PROGRESS NOTES
1930- Assumed care of pt, bedside shift report received. Pt updated on plan of care. 2145- Rounded on pt. Pt reports pain but refuses medication at this time. Infant taken to nursery for circ, pt aware, no questions at this time. 2310- Infant returned to pt bedside, circ care explained to pt, pt reports understanding.

## 2018-12-24 NOTE — PROGRESS NOTES
Bedside and Verbal shift change report given to RAY Traore (oncoming nurse) by BRIGIDA Barrow  (offgoing nurse). Report included the following information SBAR, Kardex, Intake/Output, MAR and Recent Results.

## 2018-12-24 NOTE — DISCHARGE INSTRUCTIONS
POST DELIVERY DISCHARGE INSTRUCTIONS    Name: Veda Cooper  YOB: 1985  Primary Diagnosis: Active Problems:    Pregnant (2018)      Vaginal delivery (2018)    MyChart Activation    Thank you for requesting access to [employer]. Please follow the instructions below to securely access and download your online medical record. [employer] allows you to send messages to your doctor, view your test results, renew your prescriptions, schedule appointments, and more. How Do I Sign Up? 1. In your internet browser, go to [email or web address]  2. Click on the First Time User? Click Here link in the Sign In box. You will be redirect to the New Member Sign Up page. 3. Enter your [employer] Access Code exactly as it appears below. You will not need to use this code after youve completed the sign-up process. If you do not sign up before the expiration date, you must request a new code. [employer] Access Code: [email or web address]  Expires: 3/22/2019 10:59 AM (This is the date your [employer] access code will )    4. Enter the last four digits of your Social Security Number (xxxx) and Date of Birth (mm/dd/yyyy) as indicated and click Submit. You will be taken to the next sign-up page. 5. Create a [employer] ID. This will be your [employer] login ID and cannot be changed, so think of one that is secure and easy to remember. 6. Create a [employer] password. You can change your password at any time. 7. Enter your Password Reset Question and Answer. This can be used at a later time if you forget your password. 8. Enter your e-mail address. You will receive e-mail notification when new information is available in [address] 9. Click Sign Up. You can now view and download portions of your medical record. 10. Click the Download Summary menu link to download a portable copy of your medical information.     Additional Information    If you have questions, please visit the Frequently Asked Questions section of the [employer] website at [email or web address] [email or web address] [email or web address] Remember, [employer] is NOT to be used for urgent needs. For medical emergencies, dial 911. Patient armband removed and shredded  Stroke treatment brochure was provided to: patient. Rationale for acute work-up of symptoms explained. Possible treatments, such as tPA or intervention for ischemic strokes and the need for a quick work-up, have been reviewed. Recognize signs and symptoms of STROKE:    F-face looks uneven    A-arms unable to move or move unevenly    S-speech slurred or non-existent    T-time-call 911 as soon as signs and symptoms begin-DO NOT go       Back to bed or wait to see if you get better-TIME IS BRAIN.         Signed By: Jacqueline Hernandez RN                                                                                                   Date: 12/24/2018 Time: 10:51 AM

## 2018-12-24 NOTE — ANESTHESIA POSTPROCEDURE EVALUATION
12/24/2018  7:18 AM    Laboring Epidural Follow-up Note     Referring physician: Ntete Espinosa,*   Patient status post vaginal delivery with labor epidural    Visit Vitals  /72   Pulse 87   Temp 36.9 °C (98.4 °F)   Resp 20   Ht 5' 5\" (1.651 m)   Wt 82.6 kg (182 lb)   SpO2 99%   Breastfeeding? Yes   BMI 30.29 kg/m²       Epidural removed by L&D staff  No sedation, pruritis noted. Adequate analgesia.   No obvious anesthesia complications          Eileen Venegas, CRNA

## 2018-12-24 NOTE — PROGRESS NOTES
Progress Note    Patient: Anni Bailon MRN: [id number]  SSN: [id number]    YOB: 1985  Age: 34 y.o. Sex: female    ROOM:  [facility]/01      Subjective:     Postpartum Day: 2            Delivery: vaginal delivery    The patient feels well. The patient denies emotional concerns. The baby is well. Baby is feeding via breast.  The patient is ambulating well. The patient  tolerating a normal diet. Flatus has been passed. Objective:      Patient Vitals for the past 24 hrs:   BP Temp Pulse Resp SpO2   12/24/18 0331 109/72 98.4 °F (36.9 °C) 87 20 99 %   12/23/18 1800 111/52 98.2 °F (36.8 °C) 68 17 99 %   12/23/18 1617 98/44 98.3 °F (36.8 °C) 72 19 98 %   12/23/18 0732 92/54 98 °F (36.7 °C) 69 20 99 %     Lochia:  appropriate   Uterine Fundus:   firm   Fundus Location:  -3   Incision:      DVT Evaluation:  No evidence of DVT seen on physical exam.  Negative Anita's sign. No cords or calf tenderness. No significant calf/ankle edema. Lab/Data Review: All lab results for the last 24 hours reviewed. LABS: Recent Results (from the past 48 hour(s))   HEMOGLOBIN    Collection Time: 12/22/18  9:30 AM   Result Value Ref Range    HGB 11.6 (L) 12.0 - 16.0 g/dL   HEMATOCRIT    Collection Time: 12/22/18  9:30 AM   Result Value Ref Range    HCT 35.2 35.0 - 45.0 %   HGB & HCT    Collection Time: 12/23/18  2:05 AM   Result Value Ref Range    HGB 9.7 (L) 12.0 - 16.0 g/dL    HCT 28.9 (L) 35.0 - 45.0 %        Assessment:     Status post: Doing well postpartum vaginal delivery     Plan:     Postpartum care discussed including diet, ambulation, and actvitiy restrictions. Discharge instructions and questions answered.        Signed By: Lesley Husain MD     December 24, 2018

## 2018-12-24 NOTE — PROGRESS NOTES
Post-Partum Day Number 1  Progress Note    Patient doing well post-partum without significant complaints. Voiding without difficulty, normal lochia. Breast feeding well. Emotionally stable. Breastfeeding: Infant Feeding: Breastmilk  Vitals:    Patient Vitals for the past 8 hrs:   BP Temp Pulse Resp SpO2   18 1800 111/52 98.2 °F (36.8 °C) 68 17 99 %   18 1617 98/44 98.3 °F (36.8 °C) 72 19 98 %     Temp (24hrs), Av.4 °F (36.9 °C), Min:98 °F (36.7 °C), Max:99.1 °F (37.3 °C)      Vital signs stable, afebrile. Exam:  Patient is in good general condition. Emotionally: appears to be stable  Fundus:  Firm and not tender. Lower extremities are negative for swelling, cords or tenderness. Lab/Data Review:  CBC:   Lab Results   Component Value Date/Time    WBC 7.3 2018 01:00 AM    RBC 3.88 (L) 2018 01:00 AM    HGB 9.7 (L) 2018 02:05 AM    HCT 28.9 (L) 2018 02:05 AM    PLATELET 037  01:00 AM     Lab results reviewed. For significant abnormal values and values requiring intervention, see assessment and plan. Assessment:Post Partum Day number 1 PT doing well. Plan:    Continue routine perineal care and maternal education. Plan discharge tomorrow if no problems occur.     Education:  Post partum instructions discussed                Rohan Antoine MD  2018

## 2018-12-24 NOTE — PROGRESS NOTES
0700 Bedside and Verbal shift change report given to Maite Vargas RN   (oncoming nurse) by Doris Seymour RN (offgoing nurse). Report included the following information SBAR, Intake/Output, MAR and Recent Results. Whiteboard updated. 0815 pt just finished showering. Pt assessment completed (see flowsheet). Pt requested pain meds (see MAR). Time spent with patient reviewing discharge instructions to include the following information that was also provided in written form to the patient; normal vaginal bleeding to expect how to care for perineum and how to respond should she experience an increase in vaginal bleeding. Patient instructed that she should not lift more than the weight of her baby for at least a week (2 weeks if she delivered by  section). She was encouraged to stay hydrated and informed that she should not have sex, douche, use tampons,  or place anything in the vagina until her postpartum visit. Additionally she was instructed not to use tub baths, hot tubs or pools until cleared by her midwife or physician. Patient was informed that some swelling of her legs can be expected after delivery and to elevate them whenever possible. If the swelling increases or she has signs of calf pain/tenderness, or redness that is present in one leg more than the other she is to notify her provider or present in the emergency department for evaluation if unable to reach provider. Patients having delivered by  section were instructed not to drive for the first 2 weeks, to only go up and down stairs 1 time in a day for 2 weeks as this will increase her risk for the incision to open. She was also instructed not to scrub the incision but to allow soap and water to fall onto it and to pat dry.   Patient was given signs and symptoms of infection and instructed to call provider with temperature equal to or > than 100.4, foul smelling blood or discharge from the vagina, and increase in redness or discharge from incisions or an open wound that is not healing. Patient was also instructed on the signs of postpartum depression and instructed that if she is considering harming herself or her infant, feels out of control, unable to care for herself or baby, feels sad or depressed most of the day every day, is having trouble sleeping or sleeping too much or is having trouble bonding with her baby she is to call her provider or present in the emergency department. Patient was also provided with signs and symptoms of a pulmonary embolism (PE). She was told what a PE is and instructed to call 911 if she experiences SOB (fast, shallow, rapid respirations) at rest, chest pain that worsens when coughing or change in her level of consciousness. Patient was informed that she might experience blood pressure changes during the postpartum weeks and that she should contact her provider with any severe, constant headaches that do not respond to over-the-counter pain medication, rest and/or hydration. She is also to call her provider with any changes in vision, seeing spots, or flashing lights, pain in the upper right quadrant of the abdomen, swelling of the face, hands, and/or legs more than what is expected or a change in her level of consciousness. Patient was strongly encouraged to keep her postpartum appointment and emphasized the importance of telling all providers her delivery date up until one year after the birth of her baby. Date of postpartum visit was confirmed prior to delivery. All questions were answered and patient voiced understanding of the information provided. Discharged patient per orders. Condition was stable during shift. Discharge information was reviewed; copies were given. Patient verbalized understanding. E-sign was completed.  No further needs expressed at this time

## 2018-12-24 NOTE — LACTATION NOTE
Per mom, infant latching and nursing well. Breastfeeding discharge teaching completed to include feeding on demand, foremilk and hindmilk importance, engorgement, mastitis, clogged ducts, pumping, breastmilk storage, and returning to work. Information given about breastfeeding support group and unit and office phone numbers provided and encouraged mom to reach out if concerns arise, but that [facility] would be calling her in the next few days to follow up on breastfeeding. Mom verbalized understanding and no questions at this time.

## 2018-12-24 NOTE — PROGRESS NOTES
Bedside and Verbal shift change report given to Radha Akers RN (oncoming nurse) by Natalie Cervantes. Ray Aiken RN (offgoing nurse). Report included the following information Kardex, Intake/Output and MAR.

## 2022-03-18 PROBLEM — Z34.90 PREGNANT: Status: ACTIVE | Noted: 2018-12-24

## 2022-03-18 PROBLEM — Z33.1 IUP (INTRAUTERINE PREGNANCY), INCIDENTAL: Status: ACTIVE | Noted: 2017-02-13

## 2022-08-19 PROBLEM — O99.019 ANEMIA OF PREGNANCY: Status: ACTIVE | Noted: 2022-08-19

## 2022-08-19 RX ORDER — EPINEPHRINE 1 MG/ML
0.3 INJECTION, SOLUTION, CONCENTRATE INTRAVENOUS AS NEEDED
Status: CANCELLED | OUTPATIENT
Start: 2022-08-25

## 2022-08-19 RX ORDER — HEPARIN 100 UNIT/ML
500 SYRINGE INTRAVENOUS AS NEEDED
Status: CANCELLED
Start: 2022-08-25

## 2022-08-19 RX ORDER — SODIUM CHLORIDE 9 MG/ML
5-250 INJECTION, SOLUTION INTRAVENOUS AS NEEDED
Status: CANCELLED | OUTPATIENT
Start: 2022-08-25

## 2022-08-19 RX ORDER — SODIUM CHLORIDE 9 MG/ML
5-40 INJECTION INTRAMUSCULAR; INTRAVENOUS; SUBCUTANEOUS AS NEEDED
Status: CANCELLED | OUTPATIENT
Start: 2022-08-25

## 2022-08-19 RX ORDER — DIPHENHYDRAMINE HYDROCHLORIDE 50 MG/ML
50 INJECTION, SOLUTION INTRAMUSCULAR; INTRAVENOUS AS NEEDED
Status: CANCELLED
Start: 2022-08-25

## 2022-08-19 RX ORDER — HYDROCORTISONE SODIUM SUCCINATE 100 MG/2ML
100 INJECTION, POWDER, FOR SOLUTION INTRAMUSCULAR; INTRAVENOUS AS NEEDED
Status: CANCELLED | OUTPATIENT
Start: 2022-08-25

## 2022-08-19 RX ORDER — ALBUTEROL SULFATE 0.83 MG/ML
2.5 SOLUTION RESPIRATORY (INHALATION) AS NEEDED
Status: CANCELLED
Start: 2022-08-25

## 2022-08-19 RX ORDER — ACETAMINOPHEN 325 MG/1
650 TABLET ORAL AS NEEDED
Status: CANCELLED
Start: 2022-08-25

## 2022-08-19 RX ORDER — DIPHENHYDRAMINE HYDROCHLORIDE 50 MG/ML
25 INJECTION, SOLUTION INTRAMUSCULAR; INTRAVENOUS AS NEEDED
Status: CANCELLED
Start: 2022-08-25

## 2022-08-19 RX ORDER — ONDANSETRON 2 MG/ML
8 INJECTION INTRAMUSCULAR; INTRAVENOUS AS NEEDED
Status: CANCELLED | OUTPATIENT
Start: 2022-08-25

## 2022-08-25 ENCOUNTER — HOSPITAL ENCOUNTER (OUTPATIENT)
Dept: INFUSION THERAPY | Age: 37
Discharge: HOME | End: 2022-08-25
Payer: OTHER GOVERNMENT

## 2022-08-25 VITALS
OXYGEN SATURATION: 100 % | RESPIRATION RATE: 16 BRPM | HEART RATE: 68 BPM | DIASTOLIC BLOOD PRESSURE: 56 MMHG | SYSTOLIC BLOOD PRESSURE: 98 MMHG | TEMPERATURE: 98.1 F

## 2022-08-25 DIAGNOSIS — O99.019 ANTEPARTUM ANEMIA: Primary | ICD-10-CM

## 2022-08-25 PROCEDURE — 74011000250 HC RX REV CODE- 250: Performed by: NURSE PRACTITIONER

## 2022-08-25 PROCEDURE — 74011250636 HC RX REV CODE- 250/636: Performed by: NURSE PRACTITIONER

## 2022-08-25 PROCEDURE — 96366 THER/PROPH/DIAG IV INF ADDON: CPT

## 2022-08-25 PROCEDURE — 96365 THER/PROPH/DIAG IV INF INIT: CPT

## 2022-08-25 RX ORDER — SODIUM CHLORIDE 9 MG/ML
5-250 INJECTION, SOLUTION INTRAVENOUS AS NEEDED
OUTPATIENT
Start: 2022-08-25

## 2022-08-25 RX ORDER — ACETAMINOPHEN 500 MG
TABLET ORAL DAILY
COMMUNITY

## 2022-08-25 RX ORDER — EPINEPHRINE 1 MG/ML
0.3 INJECTION, SOLUTION, CONCENTRATE INTRAVENOUS AS NEEDED
OUTPATIENT
Start: 2022-08-25

## 2022-08-25 RX ORDER — SODIUM CHLORIDE 9 MG/ML
5-250 INJECTION, SOLUTION INTRAVENOUS AS NEEDED
Status: DISPENSED | OUTPATIENT
Start: 2022-08-25 | End: 2022-08-25

## 2022-08-25 RX ORDER — SODIUM CHLORIDE 0.9 % (FLUSH) 0.9 %
5-40 SYRINGE (ML) INJECTION AS NEEDED
Status: DISPENSED | OUTPATIENT
Start: 2022-08-25 | End: 2022-08-25

## 2022-08-25 RX ORDER — ACETAMINOPHEN 325 MG/1
650 TABLET ORAL AS NEEDED
Start: 2022-08-25

## 2022-08-25 RX ORDER — DIPHENHYDRAMINE HYDROCHLORIDE 50 MG/ML
25 INJECTION, SOLUTION INTRAMUSCULAR; INTRAVENOUS AS NEEDED
Start: 2022-08-25

## 2022-08-25 RX ORDER — ALBUTEROL SULFATE 0.83 MG/ML
2.5 SOLUTION RESPIRATORY (INHALATION) AS NEEDED
Start: 2022-08-25

## 2022-08-25 RX ORDER — ONDANSETRON 2 MG/ML
8 INJECTION INTRAMUSCULAR; INTRAVENOUS AS NEEDED
OUTPATIENT
Start: 2022-08-25

## 2022-08-25 RX ORDER — HYDROCORTISONE SODIUM SUCCINATE 100 MG/2ML
100 INJECTION, POWDER, FOR SOLUTION INTRAMUSCULAR; INTRAVENOUS AS NEEDED
OUTPATIENT
Start: 2022-08-25

## 2022-08-25 RX ORDER — SODIUM CHLORIDE 0.9 % (FLUSH) 0.9 %
5-40 SYRINGE (ML) INJECTION AS NEEDED
OUTPATIENT
Start: 2022-08-25

## 2022-08-25 RX ORDER — DIPHENHYDRAMINE HYDROCHLORIDE 50 MG/ML
50 INJECTION, SOLUTION INTRAMUSCULAR; INTRAVENOUS AS NEEDED
Start: 2022-08-25

## 2022-08-25 RX ORDER — HEPARIN 100 UNIT/ML
500 SYRINGE INTRAVENOUS AS NEEDED
Start: 2022-08-25

## 2022-08-25 RX ORDER — SODIUM CHLORIDE 9 MG/ML
5-40 INJECTION INTRAMUSCULAR; INTRAVENOUS; SUBCUTANEOUS AS NEEDED
OUTPATIENT
Start: 2022-08-25

## 2022-08-25 RX ORDER — SODIUM CHLORIDE 9 MG/ML
5-250 INJECTION, SOLUTION INTRAVENOUS AS NEEDED
Status: CANCELLED | OUTPATIENT
Start: 2022-08-25

## 2022-08-25 RX ADMIN — SODIUM CHLORIDE, PRESERVATIVE FREE 10 ML: 5 INJECTION INTRAVENOUS at 10:55

## 2022-08-25 RX ADMIN — SODIUM CHLORIDE, PRESERVATIVE FREE 10 ML: 5 INJECTION INTRAVENOUS at 12:40

## 2022-08-25 RX ADMIN — IRON SUCROSE 300 MG: 20 INJECTION, SOLUTION INTRAVENOUS at 10:59

## 2022-08-25 NOTE — PROGRESS NOTES
[facility] Progress Note    Date: 2022    Name: Berenice Shook    MRN: [id number]         : 1985    Venofer Infusion    Ms. Liseth Alegre to [facility], ambulatory, at 1020. Pt was assessed and education was provided. Ms. Greyson Alfaro vitals were reviewed and patient was observed for 5 minutes prior to treatment. Visit Vitals  BP (!) 98/56 (BP 1 Location: Left arm, BP Patient Position: Sitting)   Pulse 68   Temp 98.1 °F (36.7 °C)   Resp 16   SpO2 100%   Breastfeeding No         24 g PIV placed in left x 2 attempts. PIV flushed easily and had brisk blood return. Pre-medications (none) were administered as ordered and Venofer 300  mg was initiated @ 176 ml/hr. 15 minutes into infusion, VS stable and pt denied complaints of itching, lip/tongue/facial swelling, SOB, CP or other complaints. Venofer continued (ordered rate) for remainder of infusion. Ms. Liseth Alegre tolerated the infusion, and had no complaints. VS remained stable. PIV flushed with NS 10 ml and removed. No bleeding or hematoma noted at site. Guaze and coban applied. Ms. Liseth Alegre was observed in office for 30 minutes post infusion. No s/s or reactions or complications noted. Reviewed discharge instructions with patient, including expected side effects (abdominal cramping, nausea, changes in color of urine or feces) and signs of allergic reaction requiring medical attention (itching/hives/rashes, SOB, chest pain, lip/tongue/facial swelling). Patient given printed copy to take home. Patient verbalized understanding of discharge instructions. Patient armband removed and shredded. Ms. Liseth Alegre was discharged from [place] [id number] in stable condition at 1310. She is to follow up with Dr. Zakiya Macedo as needed.     Capri Cole RN  2022  1:24 PM

## 2022-10-13 ENCOUNTER — HOSPITAL ENCOUNTER (INPATIENT)
Age: 37
LOS: 2 days | Discharge: HOME | DRG: 776 | End: 2022-10-15
Attending: OBSTETRICS & GYNECOLOGY | Admitting: OBSTETRICS & GYNECOLOGY
Payer: OTHER GOVERNMENT

## 2022-10-13 LAB
ABO + RH BLD: NORMAL
BASOPHILS # BLD: 0 K/UL (ref 0–0.1)
BASOPHILS NFR BLD: 0 % (ref 0–2)
BLOOD GROUP ANTIBODIES SERPL: NORMAL
DIFFERENTIAL METHOD BLD: ABNORMAL
EOSINOPHIL # BLD: 0 K/UL (ref 0–0.4)
EOSINOPHIL NFR BLD: 1 % (ref 0–5)
ERYTHROCYTE [DISTWIDTH] IN BLOOD BY AUTOMATED COUNT: 13.3 % (ref 11.6–14.5)
HCT VFR BLD AUTO: 34.1 % (ref 35–45)
HGB BLD-MCNC: 11.4 G/DL (ref 12–16)
IMM GRANULOCYTES # BLD AUTO: 0.1 K/UL (ref 0–0.04)
IMM GRANULOCYTES NFR BLD AUTO: 1 % (ref 0–0.5)
LYMPHOCYTES # BLD: 1.8 K/UL (ref 0.9–3.6)
LYMPHOCYTES NFR BLD: 20 % (ref 21–52)
MCH RBC QN AUTO: 32.7 PG (ref 24–34)
MCHC RBC AUTO-ENTMCNC: 33.4 G/DL (ref 31–37)
MCV RBC AUTO: 97.7 FL (ref 78–100)
MONOCYTES # BLD: 0.6 K/UL (ref 0.05–1.2)
MONOCYTES NFR BLD: 7 % (ref 3–10)
NEUTS SEG # BLD: 6.4 K/UL (ref 1.8–8)
NEUTS SEG NFR BLD: 72 % (ref 40–73)
NRBC # BLD: 0 K/UL (ref 0–0.01)
NRBC BLD-RTO: 0 PER 100 WBC
PLATELET # BLD AUTO: 154 K/UL (ref 135–420)
PMV BLD AUTO: 11.7 FL (ref 9.2–11.8)
RBC # BLD AUTO: 3.49 M/UL (ref 4.2–5.3)
SPECIMEN EXP DATE BLD: NORMAL
WBC # BLD AUTO: 8.8 K/UL (ref 4.6–13.2)

## 2022-10-13 PROCEDURE — 74011250636 HC RX REV CODE- 250/636

## 2022-10-13 PROCEDURE — 75410000003 HC RECOV DEL/VAG/CSECN EA 0.5 HR

## 2022-10-13 PROCEDURE — 74011250636 HC RX REV CODE- 250/636: Performed by: OBSTETRICS & GYNECOLOGY

## 2022-10-13 PROCEDURE — 85025 COMPLETE CBC W/AUTO DIFF WBC: CPT

## 2022-10-13 PROCEDURE — 75410000004 HC DELIVERY OUTSIDE HOSPITAL

## 2022-10-13 PROCEDURE — 86900 BLOOD TYPING SEROLOGIC ABO: CPT

## 2022-10-13 PROCEDURE — 65270000029 HC RM PRIVATE

## 2022-10-13 RX ORDER — ACETAMINOPHEN 325 MG/1
650 TABLET ORAL
Status: DISCONTINUED | OUTPATIENT
Start: 2022-10-13 | End: 2022-10-15 | Stop reason: HOSPADM

## 2022-10-13 RX ORDER — METHYLERGONOVINE MALEATE 0.2 MG/ML
0.2 INJECTION INTRAVENOUS AS NEEDED
Status: DISCONTINUED | OUTPATIENT
Start: 2022-10-13 | End: 2022-10-13 | Stop reason: HOSPADM

## 2022-10-13 RX ORDER — AMOXICILLIN 250 MG
1 CAPSULE ORAL
Status: DISCONTINUED | OUTPATIENT
Start: 2022-10-13 | End: 2022-10-15 | Stop reason: HOSPADM

## 2022-10-13 RX ORDER — PROMETHAZINE HYDROCHLORIDE 25 MG/ML
25 INJECTION, SOLUTION INTRAMUSCULAR; INTRAVENOUS
Status: DISCONTINUED | OUTPATIENT
Start: 2022-10-13 | End: 2022-10-15 | Stop reason: HOSPADM

## 2022-10-13 RX ORDER — TERBUTALINE SULFATE 1 MG/ML
0.25 INJECTION SUBCUTANEOUS
Status: DISCONTINUED | OUTPATIENT
Start: 2022-10-13 | End: 2022-10-13 | Stop reason: HOSPADM

## 2022-10-13 RX ORDER — HYDROMORPHONE HYDROCHLORIDE 1 MG/ML
1 INJECTION, SOLUTION INTRAMUSCULAR; INTRAVENOUS; SUBCUTANEOUS
Status: DISCONTINUED | OUTPATIENT
Start: 2022-10-13 | End: 2022-10-13 | Stop reason: HOSPADM

## 2022-10-13 RX ORDER — MINERAL OIL
30 OIL (ML) ORAL AS NEEDED
Status: DISCONTINUED | OUTPATIENT
Start: 2022-10-13 | End: 2022-10-13 | Stop reason: HOSPADM

## 2022-10-13 RX ORDER — NALBUPHINE HYDROCHLORIDE 10 MG/ML
10 INJECTION, SOLUTION INTRAMUSCULAR; INTRAVENOUS; SUBCUTANEOUS
Status: DISCONTINUED | OUTPATIENT
Start: 2022-10-13 | End: 2022-10-13 | Stop reason: HOSPADM

## 2022-10-13 RX ORDER — IBUPROFEN 400 MG/1
800 TABLET ORAL
Status: DISCONTINUED | OUTPATIENT
Start: 2022-10-13 | End: 2022-10-15 | Stop reason: HOSPADM

## 2022-10-13 RX ORDER — LANOLIN ALCOHOL/MO/W.PET/CERES
3 CREAM (GRAM) TOPICAL
Status: DISCONTINUED | OUTPATIENT
Start: 2022-10-13 | End: 2022-10-15 | Stop reason: HOSPADM

## 2022-10-13 RX ORDER — BUTORPHANOL TARTRATE 2 MG/ML
2 INJECTION INTRAMUSCULAR; INTRAVENOUS
Status: DISCONTINUED | OUTPATIENT
Start: 2022-10-13 | End: 2022-10-13 | Stop reason: HOSPADM

## 2022-10-13 RX ORDER — LIDOCAINE HYDROCHLORIDE 10 MG/ML
20 INJECTION, SOLUTION EPIDURAL; INFILTRATION; INTRACAUDAL; PERINEURAL AS NEEDED
Status: DISCONTINUED | OUTPATIENT
Start: 2022-10-13 | End: 2022-10-13 | Stop reason: HOSPADM

## 2022-10-13 RX ORDER — SODIUM CHLORIDE, SODIUM LACTATE, POTASSIUM CHLORIDE, CALCIUM CHLORIDE 600; 310; 30; 20 MG/100ML; MG/100ML; MG/100ML; MG/100ML
125 INJECTION, SOLUTION INTRAVENOUS CONTINUOUS
Status: DISCONTINUED | OUTPATIENT
Start: 2022-10-13 | End: 2022-10-13 | Stop reason: HOSPADM

## 2022-10-13 RX ORDER — OXYTOCIN/0.9 % SODIUM CHLORIDE 30/500 ML
PLASTIC BAG, INJECTION (ML) INTRAVENOUS
Status: COMPLETED
Start: 2022-10-13 | End: 2022-10-13

## 2022-10-13 RX ORDER — OXYCODONE AND ACETAMINOPHEN 5; 325 MG/1; MG/1
2 TABLET ORAL
Status: DISCONTINUED | OUTPATIENT
Start: 2022-10-13 | End: 2022-10-15 | Stop reason: HOSPADM

## 2022-10-13 RX ADMIN — Medication 30000 MILLI-UNITS: at 16:42

## 2022-10-13 RX ADMIN — SODIUM CHLORIDE, POTASSIUM CHLORIDE, SODIUM LACTATE AND CALCIUM CHLORIDE 125 ML/HR: 600; 310; 30; 20 INJECTION, SOLUTION INTRAVENOUS at 16:42

## 2022-10-13 NOTE — H&P
Ostetrical History and Physical    Subjective:     Date of Admission: 10/13/2022    Patient is a 30 y.o.  female admitted with extramural delivery at my office at 353 with srom followed by [facility]sd by myself with delivery of placenta as well. Pt came here with pt in ambulance. Mom now nursing doing welll. For Obstetric history, see devikaantal.    Past Medical History:   Diagnosis Date    Anemia       No past surgical history on file. Prior to Admission medications    Medication Sig Start Date End Date Taking? Authorizing Provider   cholecalciferol (VITAMIN D3) (2,000 UNITS /50 MCG) cap capsule Take  by mouth daily. Provider, Historical   PNV no.24-iron-folic acid-dha (PRENATAL DHA+COMPLETE PRENATAL) [id number] mg-mcg-mg cmpk Take  by mouth. Provider, Historical     No Known Allergies   Social History     Tobacco Use    Smoking status: Never    Smokeless tobacco: Never   Substance Use Topics    Alcohol use: No      No family history on file. Review of Systems    Objective:     Blood pressure (!) 117/45, pulse 85, height 5' 5\" (1.651 m), weight 83.5 kg (184 lb), unknown if currently breastfeeding. No data recorded. 10/13 0701 - 10/13 1900  In: -   Out: 300   No intake/output data recorded. @[facility] - not indicated   Data Review:   No results found for this or any previous visit (from the past 24 hour(s)).     Assessment:s/p  doing well     Active Problems:    IUP (intrauterine pregnancy), incidental (2017)        Plan:     Prophylaxis:  Deep Vein Thrombosis Protocol Active:Yes    Check labs:    Check  Prenatal:    Disposition    Total time spent with patient:In-Patient    Signed By: Mariposa Patterson MD                         2022

## 2022-10-13 NOTE — PROGRESS NOTES
1915 TRANSFER - IN REPORT:    Verbal report received from MAX Samuel RN(name) on Maritza Links  being received from labor and delivery (unit) for routine progression of care      Report consisted of patients Situation, Background, Assessment and   Recommendations(SBAR). Information from the following report(s) SBAR, Intake/Output, MAR, and Recent Results was reviewed with the receiving nurse. Opportunity for questions and clarification was provided. Assessment completed upon patients arrival to unit and care assumed. 0710 Bedside shift change report given to Greg Sanchez RN and Carmen Stephens RN (oncoming nurse) by Janna Vuong RN (offgoing nurse). Report included the following information SBAR, Intake/Output, MAR, and Recent Results\.

## 2022-10-13 NOTE — PROGRESS NOTES
0 Pt arrived to room 2 on [facility] with baby skin to skin. Care assumed. [address] S[address]ath. New gown and pad placed. 1900 TRANSFER - OUT REPORT:    Verbal report given to A Delia Arenas (name) on Rob Phlegm  being transferred to [facility] (unit) for routine progression of care       Report consisted of patients Situation, Background, Assessment and   Recommendations(SBAR). Information from the following report(s) SBAR, Kardex, Procedure Summary, Intake/Output, MAR, Recent Results, and Med Rec Status was reviewed with the receiving nurse. Lines:   Peripheral IV 10/13/22 Distal;Left;Posterior Hand (Active)        Opportunity for questions and clarification was provided.       Patient transported with:   Registered Nurse

## 2022-10-14 LAB
HCT VFR BLD AUTO: 29.9 % (ref 35–45)
HGB BLD-MCNC: 9.9 G/DL (ref 12–16)

## 2022-10-14 PROCEDURE — 65270000029 HC RM PRIVATE

## 2022-10-14 PROCEDURE — 36415 COLL VENOUS BLD VENIPUNCTURE: CPT

## 2022-10-14 PROCEDURE — 74011250637 HC RX REV CODE- 250/637: Performed by: OBSTETRICS & GYNECOLOGY

## 2022-10-14 PROCEDURE — 85018 HEMOGLOBIN: CPT

## 2022-10-14 RX ORDER — IBUPROFEN 800 MG/1
800 TABLET ORAL
Qty: 90 TABLET | Refills: 1 | Status: SHIPPED | OUTPATIENT
Start: 2022-10-14

## 2022-10-14 RX ADMIN — IBUPROFEN 800 MG: 400 TABLET, FILM COATED ORAL at 03:59

## 2022-10-14 NOTE — ROUTINE PROCESS
0710: Bedside and Verbal shift change report given to CASEY Camara (oncoming nurse) by BRIGIDA Chisholm and Elisabeth Wakefield RN (offgoing nurse). Report included the following information SBAR, Kardex, Intake/Output, MAR, and Recent Results. 1505 Verbal shift change report given to [facility] (oncoming nurse) by Amado Lombard (offgoing nurse). Report included the following information SBAR, Kardex, Procedure Summary, Intake/Output, MAR, and Recent Results.

## 2022-10-14 NOTE — PROGRESS NOTES
1510 Bedside and Verbal shift change report given to Mj Pack RN (oncoming nurse) by Arsalan White RN (offgoing nurse). Report included the following information SBAR, Kardex, Intake/Output, MAR, and Recent Results. 1545 Shift assessment completed. Fundus firm @ U-1, small lochia. Pain 0/10. Patient requested and received towels & body wash for a shower, IV was covered for shower. Patient expressed no further needs at this time. 1915 Bedside and Verbal shift change report given to Bob Flores RN (oncoming nurse) by Mj Pack RN (offgoing nurse). Report included the following information SBAR, Kardex, Intake/Output, MAR, and Recent Results.

## 2022-10-14 NOTE — PROGRESS NOTES
Problem: Patient Education: Go to Patient Education Activity  Goal: Patient/Family Education  Outcome: Progressing Towards Goal     Problem: Vaginal Delivery: Day of Deliver-Laboring  Goal: Off Pathway (Use only if patient is Off Pathway)  Outcome: Resolved/Met  Goal: Activity/Safety  Outcome: Resolved/Met  Goal: Consults, if ordered  Outcome: Resolved/Met  Goal: Diagnostic Test/Procedures  Outcome: Resolved/Met  Goal: Nutrition/Diet  Outcome: Resolved/Met  Goal: Discharge Planning  Outcome: Resolved/Met  Goal: Medications  Outcome: Resolved/Met  Goal: Respiratory  Outcome: Resolved/Met  Goal: Treatments/Interventions/Procedures  Outcome: Resolved/Met  Goal: *Vital signs within defined limits  Outcome: Resolved/Met  Goal: *Labs within defined limits  Outcome: Resolved/Met  Goal: *Hemodynamically stable  Outcome: Resolved/Met  Goal: *Optimal pain control at patient's stated goal  Outcome: Resolved/Met     Problem: Vaginal Delivery: Day of Delivery-Post delivery  Goal: Off Pathway (Use only if patient is Off Pathway)  Outcome: Progressing Towards Goal  Goal: Activity/Safety  Outcome: Progressing Towards Goal  Goal: Consults, if ordered  Outcome: Progressing Towards Goal  Goal: Nutrition/Diet  Outcome: Progressing Towards Goal  Goal: Discharge Planning  Outcome: Progressing Towards Goal  Goal: Medications  Outcome: Progressing Towards Goal  Goal: Treatments/Interventions/Procedures  Outcome: Progressing Towards Goal  Goal: *Vital signs within defined limits  Outcome: Progressing Towards Goal  Goal: *Labs within defined limits  Outcome: Progressing Towards Goal  Goal: *Hemodynamically stable  Outcome: Progressing Towards Goal  Goal: *Optimal pain control at patient's stated goal  Outcome: Progressing Towards Goal  Goal: *Participates in infant care  Outcome: Progressing Towards Goal  Goal: *Demonstrates progressive activity  Outcome: Progressing Towards Goal  Goal: *Tolerating diet  Outcome: Progressing Towards Goal

## 2022-10-14 NOTE — PROGRESS NOTES
Problem: Vaginal Delivery: Postpartum Day 1  Goal: Activity/Safety  Outcome: Progressing Towards Goal  Goal: Nutrition/Diet  Outcome: Progressing Towards Goal  Goal: Discharge Planning  Outcome: Progressing Towards Goal  Goal: Psychosocial  Outcome: Progressing Towards Goal  Goal: *Vital signs within defined limits  Outcome: Progressing Towards Goal  Goal: *Labs within defined limits  Outcome: Progressing Towards Goal  Goal: *Hemodynamically stable  Outcome: Progressing Towards Goal  Goal: *Optimal pain control at patient's stated goal  Outcome: Progressing Towards Goal  Goal: *Participates in infant care  Outcome: Progressing Towards Goal  Goal: *Demonstrates progressive activity  Outcome: Progressing Towards Goal  Goal: *Performs self perineal care  Outcome: Progressing Towards Goal  Goal: *Appropriate parent-infant bonding  Outcome: Progressing Towards Goal  Goal: *Tolerating diet  Outcome: Progressing Towards Goal

## 2022-10-14 NOTE — PROGRESS NOTES
1915: TRANSFER - IN REPORT:    Verbal report received from MATTEO Samuel RN (name) on [facility]  being received from L & D (unit) for routine progression of care      Report consisted of patients Situation, Background, Assessment and   Recommendations(SBAR). Information from the following report(s) SBAR, Kardex, Intake/Output, MAR, and Recent Results was reviewed with the receiving nurse. Opportunity for questions and clarification was provided. Assessment completed upon patients arrival to unit and care assumed.

## 2022-10-14 NOTE — LACTATION NOTE
This note was copied from a baby's chart.   is currently skin to skin with mom. Mom educated on breastfeeding basics--hunger cues, feeding on demand, waking baby if baby sleeps too long between feeds, importance of skin to skin, positioning and latching, risk of pacifier use and supplemental feedings, and importance of rooming in--and use of log sheet. Mom also educated on benefits of breastfeeding for herself and baby. Mom verbalized understanding. No questions at this time. Per mom, infant latched and nursed well. Encouraged mom to stimulate  to wake for a feeding. Mom verbalized understanding. Will call for assistance.

## 2022-10-14 NOTE — DISCHARGE SUMMARY
Obstetrical Discharge Summary     Name: Christa Wallace MRN: [id number]  SSN: [id number]    YOB: 1985  Age: 30 y.o. Sex: female      Allergies: Patient has no known allergies. Admit Date: 10/13/2022    Discharge Date: 10/15/2022     Admitting Physician: Erlin Mcginnis MD     Attending Physician:  Dixie Parmar MD     * Admission Diagnoses: IUP (intrauterine pregnancy), incidental [Z33.1]    * Discharge Diagnoses:   Information for the patient's :  Manuel Bernard [[id number]]   Delivery of a 3.325 kg male infant via Vaginal, Spontaneous on 10/13/2022 at 3:53 PM  by Naeem Lopez. Apgars were 8  and 9 . Additional Diagnoses:   Hospital Problems as of 10/14/2022 Date Reviewed: 10/14/2022            Codes Class Noted - Resolved POA    Postpartum care following vaginal delivery ICD-10-CM: Z39.2  ICD-9-CM: V24.2  10/14/2022 - Present Unknown        IUP (intrauterine pregnancy), incidental ICD-10-CM: Z33.1  ICD-9-CM: V22.2  2017 - Present Unknown        RESOLVED: Vaginal delivery ICD-10-CM: O80  ICD-9-CM: 247  2018 - 10/14/2022 Yes          Lab Results   Component Value Date/Time    ABO/Rh(D) O POSITIVE 10/13/2022 04:30 PM    Rubella, External Immune 2018 12:00 AM    GrBStrep, External Negative 2018 12:00 AM    ABO,Rh O positive 2018 12:00 AM      Immunization History   Administered Date(s) Administered    COVID-19, PFIZER PURPLE top, DILUTE for use, (age 15 y+), IM, 30mcg/0.3mL 2021, 2021       * Procedures:     * Discharge Condition: good    * Hospital Course: Normal hospital course following the delivery. * Disposition: home    Discharge Medications:   Current Discharge Medication List        START taking these medications    Details   ibuprofen (MOTRIN) 800 mg tablet Take 1 Tablet by mouth every eight (8) hours as needed (Pain scale 4-6).   Qty: 90 Tablet, Refills: 1  Start date: 10/14/2022           CONTINUE these medications which have NOT CHANGED    Details   cholecalciferol (VITAMIN D3) (2,000 UNITS /50 MCG) cap capsule Take  by mouth daily. [id number] summer-folic-dha -552 mg-mcg-mg cmpk Take  by mouth. * Follow-up Care/Patient Instructions:   Activity: Activity as tolerated  Diet: Regular Diet  Wound Care: None needed  Follow Up: OB provider in 6 weeks    STEPAN Bailey, LUANNE  October 14, 2022

## 2022-10-14 NOTE — PROGRESS NOTES
0715: Bedside and Verbal shift change report given to CASEY Izaguirre (oncoming nurse) by BRIGIDA Verde and Adilia Lan RN (offgoing nurse). Report included the following information SBAR, Kardex, Intake/Output, MAR, and Recent Results.

## 2022-10-14 NOTE — PROGRESS NOTES
Postpartum Progress Note    Patient: Angi Gallagher MRN: [id number]  SSN: [id number]    YOB: 1985  Age: 30 y.o. Sex: female      Subjective:     Postpartum Day: 1     Delivery: vaginal delivery    Client states she is feeling well. Reports pain is  well controlled with current medications. The baby is doing well and is feeding via breast without difficulty. The patient is ambulating well, denies dizziness, nausea, SOB with standing, and is tolerating a normal diet. States she would like to dc home today but baby needs to stay for 36hrs. Objective:      Patient Vitals for the past 8 hrs:   BP Temp Pulse Resp SpO2   10/14/22 0818 109/61 98.2 °F (36.8 °C) 73 19 100 %     General:    alert, cooperative, no distress   Lochia:  appropriate    Fundus:   Firm, midline and at umbilicus     Lab/Data Review: All lab results for the last 24 hours reviewed. Assessment:     Doing well postpartum vaginal delivery     Plan:     Postpartum care discussed including diet, ambulation, and activity. Discharge instructions and questions answered for vaginal delivery. Plan for DC tomorrow.     STEPAN Lopez, CNM  October 14, 2022

## 2022-10-15 VITALS
RESPIRATION RATE: 17 BRPM | TEMPERATURE: 98.4 F | BODY MASS INDEX: 30.66 KG/M2 | SYSTOLIC BLOOD PRESSURE: 111 MMHG | OXYGEN SATURATION: 99 % | HEIGHT: 65 IN | HEART RATE: 83 BPM | DIASTOLIC BLOOD PRESSURE: 71 MMHG | WEIGHT: 184 LBS

## 2022-10-15 NOTE — ROUTINE PROCESS
7446: Bedside and verbal shift change report given to KRISTINA Yoder, RN  by Valeriano Spicer, RN . Assumed care of pt at this time. 3223: Assessment completed at this time. 1112: D/C teaching complete and copy of D/C teaching instruction given to pt with verbal understanding. Pt given opportunity for questions and denies comments/concerns/questions at this time. Bands verified at this time.     1400: Patient armband removed and shredded

## 2022-10-15 NOTE — DISCHARGE INSTRUCTIONS
POST DELIVERY DISCHARGE INSTRUCTIONS    Name: Kristine Nance  YOB: 1985  Primary Diagnosis: Active Problems:    Postpartum care following vaginal delivery (10/14/2022)        General:     Diet/Diet Restrictions:  Eight 8-ounce glasses of fluid daily (water, juices); avoid excessive caffeine intake. Meals/snacks as desired which are high in fiber and carbohydrates and low in fat and cholesterol. Physical Activity / Restrictions / Safety:     Avoid heavy lifting, no more that 8 lbs. For 2-3 weeks; limit use of stairs to 2 times daily for the first week home. No driving for one week. Avoid intercourse 4-6 weeks, no douching or tampon use. Check with obstetrician before starting or resuming an exercise program.         Discharge Instructions/Special Treatment/Home Care Needs:     Continue prenatal vitamins. Continue to use squirt bottle with warm water on your episiotomy after each bathroom use until bleeding stops. If steri-strips applied to your incision, remove in 7-10 days. Call your doctor for the following:     Fever over 101 degrees by mouth. Vaginal bleeding heavier than a normal menstrual period or clot larger than a golf ball. Red streaks or increased swelling of legs, painful red streaks on your breast.  Painful urination, constipation and increased pain or swelling or discharge with your incision. If you feel extremely anxious or overwhelmed. If you have thoughts of harming yourself and/or your baby. Pain Management:     Pain Management:   Take Acetaminophen (Tylenol) or Ibuprofen (Advil, Motrin), as directed for pain. Use a warm Sitz bath 3 times daily to relieve episiotomy or hemorrhoidal discomfort. Heating pad to  incision as needed. For hemorrhoidal discomfort, use Tucks and Anusol cream as needed and directed. Follow-Up Care:      These are general instructions for a healthy lifestyle:    No smoking/ No tobacco products/ Avoid exposure to second hand smoke    Surgeon General's Warning:  Quitting smoking now greatly reduces serious risk to your health. Obesity, smoking, and sedentary lifestyle greatly increases your risk for illness    A healthy diet, regular physical exercise & weight monitoring are important for maintaining a healthy lifestyle    Recognize signs and symptoms of STROKE:    F-face looks uneven    A-arms unable to move or move unevenly    S-speech slurred or non-existent    T-time-call 911 as soon as signs and symptoms begin-DO NOT go       Back to bed or wait to see if you get better-TIME IS BRAIN. After Your Delivery (the Postpartum Period): Care Instructions  Overview     Congratulations on the birth of your baby. Like pregnancy, the  period can be a time of excitement, edgar, and exhaustion. You may look at your wondrous little baby and feel happy. You may also be overwhelmed by your new sleep hours and new responsibilities. At first, babies often sleep during the days and are awake at night. They do not have a pattern or routine. They may make sudden gasps, jerk themselves awake, or look like they have crossed eyes. These are all normal, and they may even make you smile. In these first weeks after delivery, try to take good care of yourself. It may take 4 to 6 weeks to feel like yourself again, and possibly longer if you had a  birth. You will likely feel very tired for several weeks. Your days will be full of ups and downs, but lots of edgar as well. Follow-up care is a key part of your treatment and safety. Be sure to make and go to all appointments, and call your doctor if you are having problems. It's also a good idea to know your test results and keep a list of the medicines you take. How can you care for yourself at home? Take care of your body after delivery  Use pads instead of tampons for the bloody flow that may last as long as 2 weeks. Ease cramps with ibuprofen (Advil, Motrin).   Ease soreness of hemorrhoids and the area between your vagina and rectum with ice compresses or witch hazel pads. Ease constipation by drinking lots of fluid and eating high-fiber foods. Ask your doctor about over-the-counter stool softeners. Cleanse yourself with a gentle squeeze of warm water from a bottle instead of wiping with toilet paper. Take a sitz bath in warm water several times a day. Wear a good nursing bra. Ease sore and swollen breasts with warm, wet washcloths. If you aren't breastfeeding, use ice rather than heat for breast soreness. Your period may not start for several months if you are breastfeeding. You may bleed more, and longer at first, than you did before you got pregnant. Wait until you are healed (about 4 to 6 weeks) before you have sex. Ask your doctor when it is okay for you to have sex. Try not to travel with your baby for 5 or 6 weeks. If you take a long car trip, make frequent stops to walk around and stretch. Avoid exhaustion  Rest every day. Try to nap when your baby naps. Ask another adult to be with you for a few days after delivery. Plan for  if you have other children. Stay flexible so you can eat at odd hours and sleep when you need to. Both you and your baby are making new schedules. Plan small trips to get out of the house. Change can make you feel less tired. Ask for help with housework, cooking, and shopping. Remind yourself that your job is to care for your baby. Know about help for postpartum depression  \"Baby blues\" are common for the first 1 to 2 weeks after birth. You may cry or feel sad or irritable for no reason. Rest whenever you can. Being tired makes it harder to handle your emotions. Go for walks with your baby. Talk to your partner, friends, and family about your feelings. If your symptoms last for more than a few weeks, or if you feel very depressed, ask your doctor for help. Postpartum depression can be treated.  Support groups and counseling can help. Sometimes medicine can also help. Stay healthy  Eat healthy foods so you have more energy. If you breastfeed, avoid drugs. If you quit smoking during pregnancy, try to stay smoke-free. If you choose to have a drink now and then, have only one drink, and limit the number of occasions that you have a drink. Wait to breastfeed at least 2 hours after you have a drink to reduce the amount of alcohol the baby may get in the milk. Start daily exercise after 4 to 6 weeks, but rest when you feel tired. Learn exercises to tone your belly. Do Kegel exercises to regain strength in your pelvic muscles. You can do these exercises while you stand or sit. Squeeze the same muscles you would use to stop your urine. Your belly and thighs should not move. Hold the squeeze for 3 seconds, and then relax for 3 seconds. Start with 3 seconds. Then add 1 second each week until you are able to squeeze for 10 seconds. Repeat the exercise 10 to 15 times for each session. Do three or more sessions each day. Find a class for you and your baby that has an exercise time. If you had a  birth, give yourself a bit more time before you exercise, and be careful. When should you call for help? Share this information with your partner, family, or a friend. They can help you watch for warning signs. Call 911  anytime you think you may need emergency care. For example, call if:    You have thoughts of harming yourself, your baby, or another person. You passed out (lost consciousness). You have chest pain, are short of breath, or cough up blood. You have a seizure. Call your doctor now or seek immediate medical care if:    You have signs of hemorrhage (too much bleeding), such as:  Heavy vaginal bleeding. This means that you are soaking through one or more pads in an hour. Or you pass blood clots bigger than an egg. Feeling dizzy or lightheaded, or you feel like you may faint.   Feeling so tired or weak that you cannot do your usual activities. A fast or irregular heartbeat. New or worse belly pain. You have signs of infection, such as:  A fever. Vaginal discharge that smells bad. New or worse belly pain. You have symptoms of a blood clot in your leg (called a deep vein thrombosis), such as:  Pain in the calf, back of the knee, thigh, or groin. Redness and swelling in your leg or groin. You have signs of preeclampsia, such as:  Sudden swelling of your face, hands, or feet. New vision problems (such as dimness, blurring, or seeing spots). A severe headache. Watch closely for changes in your health, and be sure to contact your doctor if:    Your vaginal bleeding isn't decreasing. You feel sad, anxious, or hopeless for more than a few days. You are having problems with your breasts or breastfeeding. Where can you learn more? Go to http://www.fong.com/  Enter A461 in the search box to learn more about \"After Your Delivery (the Postpartum Period): Care Instructions. \"  Current as of: June 16, 2021               Content Version: 13.2  © 8145-0523 [employer]. Care instructions adapted under license by [employer] (which disclaims liability or warranty for this information). If you have questions about a medical condition or this instruction, always ask your healthcare professional. [place] [address] any warranty or liability for your use of this information.

## 2022-10-15 NOTE — PROGRESS NOTES
Problem: Patient Education: Go to Patient Education Activity  Goal: Patient/Family Education  10/15/2022 1107 by Celina Romero, RN  Outcome: Resolved/Met  10/15/2022 0945 by Celina Romero, RN  Outcome: Progressing Towards Goal     Problem: Vaginal Delivery: Day of Delivery-Post delivery  Goal: Off Pathway (Use only if patient is Off Pathway)  10/15/2022 1107 by Cindy Blanco ([place] RN  Outcome: Resolved/Met  10/15/2022 0945 by Celina Romero, RN  Outcome: Progressing Towards Goal  Goal: Activity/Safety  10/15/2022 1107 by Cindy Blanco ([place]), RN  Outcome: Resolved/Met  10/15/2022 0945 by Cindy Blanco ([place]), RN  Outcome: Progressing Towards Goal  Goal: Consults, if ordered  10/15/2022 1107 by Bella Rivers ([place]), RN  Outcome: Resolved/Met  10/15/2022 0945 by Celina Romero, RN  Outcome: Progressing Towards Goal  Goal: Nutrition/Diet  10/15/2022 1107 by Celina Romero, RN  Outcome: Resolved/Met  10/15/2022 0945 by Celina Romero, RN  Outcome: Progressing Towards Goal  Goal: Discharge Planning  10/15/2022 1107 by Celina Romero, RN  Outcome: Resolved/Met  10/15/2022 0945 by Celina Romero, RN  Outcome: Progressing Towards Goal  Goal: Medications  10/15/2022 1107 by Celina Romero, RN  Outcome: Resolved/Met  10/15/2022 0945 by Celina Romero, RN  Outcome: Progressing Towards Goal  Goal: Treatments/Interventions/Procedures  10/15/2022 1107 by Cindy Blanco ([place]), RN  Outcome: Resolved/Met  10/15/2022 0945 by Cindy Blanco ([place]), RN  Outcome: Progressing Towards Goal  Goal: *Vital signs within defined limits  10/15/2022 1107 by Bella Rivers ([place]), RN  Outcome: Resolved/Met  10/15/2022 0945 by Cindy Blanco ([place]), RN  Outcome: Progressing Towards Goal  Goal: *Labs within defined limits  10/15/2022 1107 by Bella Rivers ([place]), RN  Outcome: Resolved/Met  10/15/2022 0945 by Cindy Blanco ([place]), RN  Outcome: Progressing Towards Goal  Goal: *Hemodynamically stable  10/15/2022 1107 by Celina Romero, RN  Outcome: Resolved/Met  10/15/2022 0945 by Alexandria Brenner, RN  Outcome: Progressing Towards Goal  Goal: *Optimal pain control at patient's stated goal  10/15/2022 1107 by Bella Hammond ([place]), RN  Outcome: Resolved/Met  10/15/2022 0945 by Holyl Blanco ([place]), RN  Outcome: Progressing Towards Goal  Goal: *Participates in infant care  10/15/2022 1107 by Holly Blanco ([place]), RN  Outcome: Resolved/Met  10/15/2022 0945 by Holly Blanco ([place]), RN  Outcome: Progressing Towards Goal  Goal: *Demonstrates progressive activity  10/15/2022 1107 by Holly Blanco ([place]), RN  Outcome: Resolved/Met  10/15/2022 0945 by Holly Blanco ([place]), RN  Outcome: Progressing Towards Goal  Goal: *Tolerating diet  10/15/2022 1107 by Holly Blanco ([place]), RN  Outcome: Resolved/Met  10/15/2022 0945 by Holly Blanco ([place]), RN  Outcome: Progressing Towards Goal     Problem: Vaginal Delivery: Postpartum Day 1  Goal: Off Pathway (Use only if patient is Off Pathway)  10/15/2022 1107 by Holly Blanco ([place]), RN  Outcome: Resolved/Met  10/15/2022 0945 by Alexandria Brenner, RN  Outcome: Progressing Towards Goal  Goal: Activity/Safety  10/15/2022 1107 by Holly Blanco ([place]), RN  Outcome: Resolved/Met  10/15/2022 0945 by Holly Blanco ([place]), RN  Outcome: Progressing Towards Goal  Goal: Consults, if ordered  10/15/2022 1107 by Bella Hammond ([place]), RN  Outcome: Resolved/Met  10/15/2022 0945 by Alexandria Brenner, RN  Outcome: Progressing Towards Goal  Goal: Diagnostic Test/Procedures  10/15/2022 1107 by Holly Blanco ([place]), RN  Outcome: Resolved/Met  10/15/2022 0945 by Alexandria Brenner, RN  Outcome: Progressing Towards Goal  Goal: Nutrition/Diet  10/15/2022 1107 by Holly Blanco ([place]), RN  Outcome: Resolved/Met  10/15/2022 0945 by Alexandria Brenner, RN  Outcome: Progressing Towards Goal  Goal: Discharge Planning  10/15/2022 1107 by Alexandria Brenner, RN  Outcome: Resolved/Met  10/15/2022 0945 by Alexandria Brenner, RN  Outcome: Progressing Towards Goal  Goal: Medications  10/15/2022 1107 by Alexandria Brenner, RN  Outcome: Resolved/Met  10/15/2022 0945 by Holly Blanco ([place]), RN  Outcome: Progressing Towards Goal  Goal: Treatments/Interventions/Procedures  10/15/2022 1107 by Harris Renteria, RN  Outcome: Resolved/Met  10/15/2022 0945 by Reina Blanco ([place]), RN  Outcome: Progressing Towards Goal  Goal: Psychosocial  10/15/2022 1107 by Harris Renteria, RN  Outcome: Resolved/Met  10/15/2022 0945 by Harris Renteria, RN  Outcome: Progressing Towards Goal  Goal: *Vital signs within defined limits  10/15/2022 1107 by Neel Gleason [facility] ([place]), RN  Outcome: Resolved/Met  10/15/2022 0945 [facility]  Outcome: Progressing Towards Goal  Goal: *Labs within defined limits  10/15/2022 1107 by Neel Gleason [facility] ([place]), RN  Outcome: Resolved/Met  10/15/2022 0945 [facility] ([place] RN  Outcome: Progressing Towards Goal  Goal: *Hemodynamically stable  10/15/2022 1107 by Neel Gleason [facility] ([place]), RN  Outcome: Resolved/Met  10/15/2022 0945 by Harris Renteria, RN  Outcome: Progressing Towards Goal  Goal: *Optimal pain control at patient's stated goal  10/15/2022 1107 by Neel Gleason [facility] ([place]), RN  Outcome: Resolved/Met  10/15/2022 0945 by Reina Patel [facility] ([place]), RN  Outcome: Progressing Towards Goal  Goal: *Participates in infant care  10/15/2022 1107 by Reina Patel [facility] ([place]), RN  Outcome: Resolved/Met  10/15/2022 0945 [facility] ([place]  Outcome: Progressing Towards Goal  Goal: *Demonstrates progressive activity  10/15/2022 1107 [facility] ([place] RN  Outcome: Resolved/Met  10/15/2022 0945 [facility] ([place]  Outcome: Progressing Towards Goal  Goal: *Performs self perineal care  10/15/2022 1107 by Harris Renteria, RN  Outcome: Resolved/Met  10/15/2022 0945 by Reina Blanco ([place]), RN  Outcome: Progressing Towards Goal  Goal: *Appropriate parent-infant bonding  10/15/2022 1107 by Harris Renteria, RN  Outcome: Resolved/Met  10/15/2022 0945 by Reina Blanco ([place]), RN  Outcome: Progressing Towards Goal  Goal: *Tolerating diet  10/15/2022 1107 by Reina Blanco ([place]), RN  Outcome: Resolved/Met  10/15/2022 0945 by Harris Renteria, RN  Outcome: Progressing Towards Goal  Goal: *Performs self breast care  10/15/2022 1107 by Bill Prince RN  Outcome: Resolved/Met  10/15/2022 0945 by Bill Prince RN  Outcome: Progressing Towards Goal     Problem: Vaginal Delivery: Postpartum 2  Goal: Off Pathway (Use only if patient is Off Pathway)  10/15/2022 1107 by Pat Blanco ([place]), RN  Outcome: Resolved/Met  10/15/2022 0945 by Bill Prince RN  Outcome: Progressing Towards Goal  Goal: Activity/Safety  10/15/2022 1107 by Pat Blanco ([place]), RN  Outcome: Resolved/Met  10/15/2022 0945 by Pat Blanco ([place]), RN  Outcome: Progressing Towards Goal  Goal: Consults, if ordered  10/15/2022 1107 by Bella Anaya ([place]), RN  Outcome: Resolved/Met  10/15/2022 0945 by Bill Prince RN  Outcome: Progressing Towards Goal  Goal: Nutrition/Diet  10/15/2022 1107 by Bill Prince RN  Outcome: Resolved/Met  10/15/2022 0945 by Bill Prince RN  Outcome: Progressing Towards Goal  Goal: Discharge Planning  10/15/2022 1107 by Bill Prince RN  Outcome: Resolved/Met  10/15/2022 0945 by Bill Prince RN  Outcome: Progressing Towards Goal  Goal: Medications  10/15/2022 1107 by Bill Prince, RN  Outcome: Resolved/Met  10/15/2022 0945 by Bill Prince RN  Outcome: Progressing Towards Goal  Goal: Treatments/Interventions/Procedures  10/15/2022 1107 by Bill Prince, RN  Outcome: Resolved/Met  10/15/2022 0945 by Bill Prince RN  Outcome: Progressing Towards Goal  Goal: Psychosocial  10/15/2022 1107 by Pat Blanco ([place]), RN  Outcome: Resolved/Met  10/15/2022 0945 by Pat Blanco ([place]), RN  Outcome: Progressing Towards Goal     Problem: Vaginal Delivery: Discharge Outcomes  Goal: *Verbalizes name, dosage, time, side effects, and number of days to continue medications  10/15/2022 1107 by Bella Anaya ([place]), RN  Outcome: Resolved/Met  10/15/2022 0945 by Bill Prince, IRINA  Outcome: Progressing Towards Goal  Goal: *Describes available resources and support systems  10/15/2022 1107 by Bella Anaya ([place]), RN  Outcome: Resolved/Met  10/15/2022 0945 by Dawson Sanches, RN  Outcome: Progressing Towards Goal  Goal: *No signs and symptoms of infection  10/15/2022 1107 by Tayla Blanco ([place]), RN  Outcome: Resolved/Met  10/15/2022 0945 by Dawson Sanches, RN  Outcome: Progressing Towards Goal  Goal: *Birth certificate information completed  10/15/2022 1107 by Dawson Sanches, RN  Outcome: Resolved/Met  10/15/2022 0945 by Dawson Sanches, RN  Outcome: Progressing Towards Goal  Goal: *Received and verbalizes understanding of discharge plan and instructions  10/15/2022 1107 by Dawson Sanches, RN  Outcome: Resolved/Met  10/15/2022 0945 by Dawson Sanches, RN  Outcome: Progressing Towards Goal  Goal: *Vital signs within defined limits  10/15/2022 1107 by Bella Concepcion ([place]), RN  Outcome: Resolved/Met  10/15/2022 0945 by Dawson Sanches RN  Outcome: Progressing Towards Goal  Goal: *Labs within defined limits  10/15/2022 1107 by Bella Concepcion ([place]), RN  Outcome: Resolved/Met  10/15/2022 0945 by Tayla Blanco ([place]), RN  Outcome: Progressing Towards Goal  Goal: *Hemodynamically stable  10/15/2022 1107 by Bella Concepcion ([place]), RN  Outcome: Resolved/Met  10/15/2022 0945 by Tayla Blanco ([place]), RN  Outcome: Progressing Towards Goal  Goal: *Optimal pain control at patient's stated goal  10/15/2022 1107 by Bella Concepcion ([place]), RN  Outcome: Resolved/Met  10/15/2022 0945 by Tayla Blanco ([place]), RN  Outcome: Progressing Towards Goal  Goal: *Participates in infant care  10/15/2022 1107 by Tayla Blanco ([place]), RN  Outcome: Resolved/Met  10/15/2022 0945 by Tayla Blanco ([place]), RN  Outcome: Progressing Towards Goal  Goal: *Demonstrates progressive activity  10/15/2022 1107 by Tayla Blanco ([place]), RN  Outcome: Resolved/Met  10/15/2022 0945 by Dawson Sanches RN  Outcome: Progressing Towards Goal  Goal: *Appropriate parent-infant bonding  10/15/2022 1107 by Dawson Sanches RN  Outcome: Resolved/Met  10/15/2022 0945 by Dawson Sanches RN  Outcome: Progressing Towards Goal  Goal: *Tolerating diet  10/15/2022 1107 by Alexandria Brenner, RN  Outcome: Resolved/Met  10/15/2022 0945 by Alexandria Brenner, RN  Outcome: Progressing Towards Goal

## 2022-10-15 NOTE — PROGRESS NOTES
Verbal shift change report given to RADHA Yoder RN (oncoming nurse) by Andrzej Villarreal RN (offgoing nurse). Report included the following information SBAR, Kardex, Procedure Summary, Intake/Output, MAR, and Recent Results.

## 2022-10-15 NOTE — PROGRESS NOTES
1915 Bedside and Verbal shift change report given to Zoë Iniguez RN (oncoming nurse) by Fara Mathews RN (offgoing nurse). Report included the following information SBAR, Kardex, Intake/Output, MAR, and Recent Results.

## 2022-12-20 ENCOUNTER — HOSPITAL ENCOUNTER (EMERGENCY)
Age: 37
Discharge: HOME | End: 2022-12-20
Attending: EMERGENCY MEDICINE
Payer: OTHER GOVERNMENT

## 2022-12-20 ENCOUNTER — APPOINTMENT (OUTPATIENT)
Dept: GENERAL RADIOLOGY | Age: 37
End: 2022-12-20
Attending: EMERGENCY MEDICINE
Payer: OTHER GOVERNMENT

## 2022-12-20 VITALS
HEART RATE: 79 BPM | TEMPERATURE: 97.8 F | RESPIRATION RATE: 16 BRPM | HEIGHT: 65 IN | WEIGHT: 170 LBS | DIASTOLIC BLOOD PRESSURE: 66 MMHG | SYSTOLIC BLOOD PRESSURE: 109 MMHG | BODY MASS INDEX: 28.32 KG/M2 | OXYGEN SATURATION: 100 %

## 2022-12-20 DIAGNOSIS — J18.9 COMMUNITY ACQUIRED PNEUMONIA, UNSPECIFIED LATERALITY: Primary | ICD-10-CM

## 2022-12-20 LAB
FLUAV RNA SPEC QL NAA+PROBE: NOT DETECTED
FLUBV RNA SPEC QL NAA+PROBE: NOT DETECTED
S PYO AG THROAT QL: NEGATIVE
SARS-COV-2, COV2: NOT DETECTED

## 2022-12-20 PROCEDURE — 87636 SARSCOV2 & INF A&B AMP PRB: CPT

## 2022-12-20 PROCEDURE — 87880 STREP A ASSAY W/OPTIC: CPT

## 2022-12-20 PROCEDURE — 71046 X-RAY EXAM CHEST 2 VIEWS: CPT

## 2022-12-20 PROCEDURE — 99284 EMERGENCY DEPT VISIT MOD MDM: CPT

## 2022-12-20 PROCEDURE — 87147 CULTURE TYPE IMMUNOLOGIC: CPT

## 2022-12-20 PROCEDURE — 87070 CULTURE OTHR SPECIMN AEROBIC: CPT

## 2022-12-20 RX ORDER — AMOXICILLIN AND CLAVULANATE POTASSIUM 875; 125 MG/1; MG/1
1 TABLET, FILM COATED ORAL 2 TIMES DAILY
Qty: 20 TABLET | Refills: 0 | Status: SHIPPED | OUTPATIENT
Start: 2022-12-20 | End: 2022-12-30

## 2022-12-20 RX ORDER — ALBUTEROL SULFATE 90 UG/1
2 AEROSOL, METERED RESPIRATORY (INHALATION)
Qty: 1 EACH | Refills: 0 | Status: SHIPPED | OUTPATIENT
Start: 2022-12-20

## 2022-12-20 RX ORDER — AZITHROMYCIN 250 MG/1
TABLET, FILM COATED ORAL
Qty: 6 TABLET | Refills: 0 | Status: SHIPPED | OUTPATIENT
Start: 2022-12-20 | End: 2022-12-25

## 2022-12-20 NOTE — ED PROVIDER NOTES
EMERGENCY DEPARTMENT HISTORY AND PHYSICAL EXAM    Date: 12/20/2022  Patient Name: Tom Baxter    History of Presenting Illness     Chief Complaint   Patient presents with    Sore Throat    Cough         History Provided By: Patient    Chief Complaint: cough, sore throat    HPI(Context):   3:08 PM  Tom Baxter is a 30 y.o. female with PMHX of anemia who presents to the emergency department C/O cough. Associated sxs include sore throat and congestion. Pt notes sxs x 5 days. Pt had fever on first day but this has since resolved. Daughter was sick with similar. Pt has 3month old at home. No resp history. Pt is not DM and is immunocompetent. Pt denies CP, SOB, vomiting, diarrhea, myalgias, and any other sxs or complaints. Pt is nonsmoker. PCP: None    Current Outpatient Medications   Medication Sig Dispense Refill    amoxicillin-clavulanate (Augmentin) 875-125 mg per tablet Take 1 Tablet by mouth two (2) times a day for 10 days. 20 Tablet 0    azithromycin (Zithromax Z-Guicho) 250 mg tablet Take 2 pills on day 1 and 1 pill on days 2-5 6 Tablet 0    albuterol (PROVENTIL HFA, VENTOLIN HFA, PROAIR HFA) 90 mcg/actuation inhaler Take 2 Puffs by inhalation every four (4) hours as needed for Wheezing or Shortness of Breath. 1 Each 0    inhalational spacing device 1 Each by Does Not Apply route as needed (to be used with albuterol HFA.). Please dispense one adult spacer 1 Each 0    ibuprofen (MOTRIN) 800 mg tablet Take 1 Tablet by mouth every eight (8) hours as needed (Pain scale 4-6). 90 Tablet 1    cholecalciferol (VITAMIN D3) (2,000 UNITS /50 MCG) cap capsule Take  by mouth daily. [id number] summer-folic-dha -588 mg-mcg-mg cmpk Take  by mouth. Past History     Past Medical History:  Past Medical History:   Diagnosis Date    Anemia        Past Surgical History:  History reviewed. No pertinent surgical history. Family History:  History reviewed. No pertinent family history.     Social History:  Social History     Tobacco Use    Smoking status: Never    Smokeless tobacco: Never   Substance Use Topics    Alcohol use: No    Drug use: No       Allergies:  No Known Allergies      Review of Systems   Review of Systems   Constitutional:  Negative for chills and fever (first day only, since resolved). HENT:  Positive for congestion and sore throat. Respiratory:  Positive for cough. Negative for shortness of breath. Cardiovascular:  Negative for chest pain. Gastrointestinal:  Negative for diarrhea, nausea and vomiting. Musculoskeletal:  Negative for myalgias. Allergic/Immunologic: Negative for immunocompromised state. Neurological:  Negative for dizziness, light-headedness and headaches. All other systems reviewed and are negative. Physical Exam     Vitals:    12/20/22 1350   BP: 109/66   Pulse: 79   Resp: 16   Temp: 97.8 °F (36.6 °C)   SpO2: 100%   Weight: 77.1 kg (170 lb)   Height: 5' 5\" (1.651 m)     Physical Exam  Vitals and nursing note reviewed. Constitutional:       General: She is not in acute distress. Appearance: She is well-developed. She is not diaphoretic. Comments: AA female in NAD. Alert. Sitting in chair in fast track. Appears comfortable. HENT:      Head: Normocephalic and atraumatic. Jaw: No trismus. Right Ear: External ear normal. No swelling or tenderness. Tympanic membrane is not perforated, erythematous or bulging. Left Ear: External ear normal. No swelling or tenderness. Tympanic membrane is not perforated, erythematous or bulging. Nose: Congestion present. No mucosal edema or rhinorrhea. Mouth/Throat:      Mouth: No oral lesions. Dentition: No dental abscesses. Pharynx: Uvula midline. No oropharyngeal exudate, posterior oropharyngeal erythema or uvula swelling. Tonsils: No tonsillar abscesses. Eyes:      General: No scleral icterus. Right eye: No discharge. Left eye: No discharge.       Conjunctiva/sclera: Conjunctivae normal.   Cardiovascular:      Rate and Rhythm: Normal rate and regular rhythm. Heart sounds: Normal heart sounds. No murmur heard. No friction rub. No gallop. Pulmonary:      Effort: Pulmonary effort is normal. No tachypnea, accessory muscle usage or respiratory distress. Breath sounds: Normal breath sounds. No decreased breath sounds, wheezing, rhonchi or rales. Musculoskeletal:         General: Normal range of motion. Cervical back: Normal range of motion. Lymphadenopathy:      Cervical: No cervical adenopathy. Skin:     General: Skin is warm and dry. Neurological:      Mental Status: She is alert and oriented to person, place, and time. Psychiatric:         Behavior: Behavior normal.         Judgment: Judgment normal.           Diagnostic Study Results     Labs -     Recent Results (from the past 12 hour(s))   POC GROUP A STREP    Collection Time: 12/20/22  1:58 PM   Result Value Ref Range    Group A strep (POC) Negative NEG     COVID-19 WITH INFLUENZA A/B    Collection Time: 12/20/22  2:00 PM   Result Value Ref Range    SARS-CoV-2 by PCR Not detected NOTD      Influenza A by PCR Not detected NOTD      Influenza B by PCR Not detected NOTD         Radiologic Studies -  XR CHEST PA LAT   Final Result      Patchy lower lobe pneumonic infiltrates without evidence of concomitant pleural   effusion. CT Results  (Last 48 hours)      None          CXR Results  (Last 48 hours)                 12/20/22 1417  XR CHEST PA LAT Final result    Impression:      Patchy lower lobe pneumonic infiltrates without evidence of concomitant pleural   effusion.        Narrative:  EXAM: XR CHEST PA LAT       CLINICAL INDICATION/HISTORY: cough   -Additional: None       COMPARISON: None       TECHNIQUE: PA and lateral views of the chest       _______________       FINDINGS:       HEART AND MEDIASTINUM: Cardiac size and mediastinal contours are within normal   limits       LUNGS AND PLEURAL SPACES: Patchy lower lung zone opacities are seen bilaterally. No pneumothorax or pleural effusion. BONY THORAX AND SOFT TISSUES: No acute osseous abnormality       _______________                   Medications given in the ED-  Medications - No data to display      Medical Decision Making   I am the first provider for this patient. I reviewed the vital signs, available nursing notes, past medical history, past surgical history, family history and social history. Vital Signs-Reviewed the patient's vital signs. Pulse Oximetry Analysis - 100% on RA. NORMAL    Records Reviewed: Nursing Notes    Provider Notes (Medical Decision Making): URI, strep, mono, influenza, COVID, PNA, bronchitis, asthma/RAD, allergic    Procedures:  Procedures    ED Course:   3:08 PM Initial assessment performed. The patients presenting problems have been discussed, and they are in agreement with the care plan formulated and outlined with them. I have encouraged them to ask questions as they arise throughout their visit. Diagnosis and Disposition       Afebrile. Lungs CTAB. No SOB complaints, resp distress, or hypoxia. Rapid strep neg. Influenza and COVID neg. CXR reveals infiltrates. Pt is breast feeding. Will Rx Augmentin and azithromycin for CAP. Albuterol. Pt is not DM and is immunocompetent. PCP FU. Discussed need for mask wearing and frequent hand washing to protect 3month old at home. Reasons to RTED discussed with pt. All questions answered. Pt feels comfortable going home at this time. Pt expressed understanding and she agrees with plan. 1. Community acquired pneumonia, unspecified laterality    2. Patient is a currently breast-feeding mother        PLAN:  1. D/C Home  2. Current Discharge Medication List        START taking these medications    Details   amoxicillin-clavulanate (Augmentin) 875-125 mg per tablet Take 1 Tablet by mouth two (2) times a day for 10 days.   Qty: 20 Tablet, Refills: 0  Start date: 12/20/2022, End date: 12/30/2022      azithromycin (Zithromax Z-Guicho) 250 mg tablet Take 2 pills on day 1 and 1 pill on days 2-5  Qty: 6 Tablet, Refills: 0  Start date: 12/20/2022, End date: 12/25/2022      albuterol (PROVENTIL HFA, VENTOLIN HFA, PROAIR HFA) 90 mcg/actuation inhaler Take 2 Puffs by inhalation every four (4) hours as needed for Wheezing or Shortness of Breath. Qty: 1 Each, Refills: 0  Start date: 12/20/2022      inhalational spacing device 1 Each by Does Not Apply route as needed (to be used with albuterol HFA.). Please dispense one adult spacer  Qty: 1 Each, Refills: 0  Start date: 12/20/2022           3. Follow-up Information       Follow up With Specialties Details Why Contact Aleida GÓMEZ    [phone]    [facility] EMERGENCY DEPT Emergency Medicine   [address] [phone]          _______________________________    Attestations: This note is prepared by Holly Nesbitt PA-C.  _______________________________      Please note that this dictation was completed with [employer], the computer voice recognition software. Quite often unanticipated grammatical, syntax, homophones, and other interpretive errors are inadvertently transcribed by the computer software. Please disregard these errors. Please excuse any errors that have escaped final proofreading.

## 2022-12-21 LAB
BACTERIA SPEC CULT: ABNORMAL
BACTERIA SPEC CULT: ABNORMAL
SERVICE CMNT-IMP: ABNORMAL

## 2025-01-06 ENCOUNTER — HOSPITAL ENCOUNTER (EMERGENCY)
Facility: HOSPITAL | Age: 40
Discharge: HOME | End: 2025-01-06
Payer: OTHER GOVERNMENT

## 2025-01-06 VITALS
OXYGEN SATURATION: 100 % | BODY MASS INDEX: 29.66 KG/M2 | TEMPERATURE: 97.7 F | DIASTOLIC BLOOD PRESSURE: 64 MMHG | WEIGHT: 178 LBS | RESPIRATION RATE: 18 BRPM | SYSTOLIC BLOOD PRESSURE: 115 MMHG | HEART RATE: 72 BPM | HEIGHT: 65 IN

## 2025-01-06 DIAGNOSIS — J06.9 UPPER RESPIRATORY INFECTION WITH COUGH AND CONGESTION: Primary | ICD-10-CM

## 2025-01-06 DIAGNOSIS — J04.0 LARYNGITIS: ICD-10-CM

## 2025-01-06 LAB
FLUAV RNA SPEC QL NAA+PROBE: NOT DETECTED
FLUBV RNA SPEC QL NAA+PROBE: NOT DETECTED
SARS-COV-2 RNA RESP QL NAA+PROBE: NOT DETECTED
SOURCE: NORMAL

## 2025-01-06 PROCEDURE — 87636 SARSCOV2 & INF A&B AMP PRB: CPT

## 2025-01-06 PROCEDURE — 99283 EMERGENCY DEPT VISIT LOW MDM: CPT

## 2025-01-06 RX ORDER — FLUTICASONE PROPIONATE 50 MCG
1 SPRAY, SUSPENSION (ML) NASAL DAILY
Qty: 32 G | Refills: 1 | Status: SHIPPED | OUTPATIENT
Start: 2025-01-06

## 2025-01-06 RX ORDER — ALBUTEROL SULFATE 90 UG/1
2 INHALANT RESPIRATORY (INHALATION) 4 TIMES DAILY PRN
Qty: 54 G | Refills: 1 | Status: SHIPPED | OUTPATIENT
Start: 2025-01-06

## 2025-01-06 RX ORDER — GUAIFENESIN 600 MG/1
600 TABLET, EXTENDED RELEASE ORAL 2 TIMES DAILY
Qty: 20 TABLET | Refills: 0 | Status: SHIPPED | OUTPATIENT
Start: 2025-01-06 | End: 2025-01-16

## 2025-01-06 RX ORDER — HUMIDIFIER
1 EACH MISCELLANEOUS 3 TIMES DAILY PRN
Qty: 1 EACH | Refills: 0 | Status: SHIPPED | OUTPATIENT
Start: 2025-01-06

## 2025-01-06 RX ORDER — L-DESOXYEPHEDRINE 50 MG
1 INHALER (EA) NASAL 3 TIMES DAILY PRN
Qty: 50 G | Refills: 0 | Status: SHIPPED | OUTPATIENT
Start: 2025-01-06

## 2025-01-06 ASSESSMENT — ENCOUNTER SYMPTOMS
COUGH: 1
ALLERGIC/IMMUNOLOGIC NEGATIVE: 1
GASTROINTESTINAL NEGATIVE: 1
SORE THROAT: 0
SHORTNESS OF BREATH: 0
RHINORRHEA: 1
VOICE CHANGE: 1
WHEEZING: 0
EYES NEGATIVE: 1

## 2025-01-07 NOTE — ED PROVIDER NOTES
completed with a voice recognition program.  Efforts were made to edit the dictations but occasionally words are mis-transcribed.)    WADE Chow (electronically signed)  Attending Emergency Physician           Susana Langston PA  01/06/25 [id number]

## 2025-01-18 ENCOUNTER — HOSPITAL ENCOUNTER (EMERGENCY)
Facility: HOSPITAL | Age: 40
Discharge: HOME | End: 2025-01-18
Payer: OTHER GOVERNMENT

## 2025-01-18 ENCOUNTER — APPOINTMENT (OUTPATIENT)
Facility: HOSPITAL | Age: 40
End: 2025-01-18
Payer: OTHER GOVERNMENT

## 2025-01-18 VITALS
HEART RATE: 78 BPM | SYSTOLIC BLOOD PRESSURE: 123 MMHG | BODY MASS INDEX: 28.29 KG/M2 | RESPIRATION RATE: 18 BRPM | TEMPERATURE: 98.4 F | OXYGEN SATURATION: 100 % | DIASTOLIC BLOOD PRESSURE: 71 MMHG | WEIGHT: 170 LBS

## 2025-01-18 DIAGNOSIS — J40 BRONCHITIS: Primary | ICD-10-CM

## 2025-01-18 PROCEDURE — 71046 X-RAY EXAM CHEST 2 VIEWS: CPT

## 2025-01-18 PROCEDURE — 99283 EMERGENCY DEPT VISIT LOW MDM: CPT

## 2025-01-18 RX ORDER — PREDNISONE 20 MG/1
TABLET ORAL
Qty: 7 TABLET | Refills: 0 | Status: SHIPPED | OUTPATIENT
Start: 2025-01-18

## 2025-01-19 NOTE — ED PROVIDER NOTES
[facility]PART[facility]  EMERGENCY DEPARTMENT ENCOUNTER       Pt Name: Tita Vazquez  MRN: [id number]  Birthdate 1985  Date of evaluation: 1/18/2025  PCP: No primary care provider on file.  Note Started: 12:45 AM 1/19/25     CHIEF COMPLAINT       Chief Complaint   Patient presents with    Cough    Shortness of Breath        HISTORY OF PRESENT ILLNESS: 1 or more elements      History From: Patient  HPI Limitations: None  Chronic Conditions: none  Social Determinants affecting Dx or Tx: none      Tita Vazquez is a 39 y.o. female who presents to ED c/o cough, sometimes productive of sputum and slight shortness of breath when she talks for the past 2 weeks.  Initially had fever sore throat and more congestion but that has since resolved but cough lingers.  She denies history of asthma COPD or smoking.  She denies chest pain, orthopnea, dyspnea on exertion, wheezing, exogenous estrogen use, recent long distance travel any other symptoms or complaints.     Nursing Notes were all reviewed and agreed with or any disagreements were addressed in the HPI.    PAST HISTORY     Past Medical History:  Past Medical History:   Diagnosis Date    Anemia        Past Surgical History:  No past surgical history on file.    Family History:  No family history on file.    Social History:  Social History     Socioeconomic History    Marital status:     Years of education: Associates degree   Tobacco Use    Smoking status: Never    Smokeless tobacco: Never   Substance and Sexual Activity    Alcohol use: No    Drug use: No       Allergies:  No Known Allergies    CURRENT MEDICATIONS      No current facility-administered medications for this encounter.     Current Outpatient Medications   Medication Sig Dispense Refill    predniSONE (DELTASONE) 20 MG tablet Take 2 tabs PO once daily for 2 days, then 1 tab PO once daily for 2 days and then 0.5 tab PO once daily for 2 days. 7 tablet 0    albuterol sulfate HFA (VENTOLIN HFA)

## 2025-01-19 NOTE — ED TRIAGE NOTES
Patient arrived to the ED from home with complaints of shortness of breath and coughing for the last two weeks. Alert and oriented. NAD.

## 2025-01-19 NOTE — ED NOTES
Xray complete, pt to d/c home. Patient given discharge papers. Patient understanding of discharge. Patient ambulatory out of ED